# Patient Record
Sex: MALE | Race: WHITE | Employment: FULL TIME | URBAN - METROPOLITAN AREA
[De-identification: names, ages, dates, MRNs, and addresses within clinical notes are randomized per-mention and may not be internally consistent; named-entity substitution may affect disease eponyms.]

---

## 2017-03-28 ENCOUNTER — OFFICE VISIT (OUTPATIENT)
Dept: FAMILY MEDICINE CLINIC | Facility: CLINIC | Age: 57
End: 2017-03-28

## 2017-03-28 VITALS
HEIGHT: 70.5 IN | WEIGHT: 185 LBS | OXYGEN SATURATION: 97 % | BODY MASS INDEX: 26.19 KG/M2 | SYSTOLIC BLOOD PRESSURE: 100 MMHG | DIASTOLIC BLOOD PRESSURE: 60 MMHG | RESPIRATION RATE: 16 BRPM | TEMPERATURE: 98 F | HEART RATE: 68 BPM

## 2017-03-28 DIAGNOSIS — M79.89 PAIN AND SWELLING OF TOE, RIGHT: Primary | ICD-10-CM

## 2017-03-28 DIAGNOSIS — M79.674 PAIN AND SWELLING OF TOE, RIGHT: Primary | ICD-10-CM

## 2017-03-28 DIAGNOSIS — M79.671 FOOT PAIN, RIGHT: ICD-10-CM

## 2017-03-28 PROCEDURE — 99214 OFFICE O/P EST MOD 30 MIN: CPT | Performed by: FAMILY MEDICINE

## 2017-03-28 RX ORDER — IBUPROFEN 200 MG
200 TABLET ORAL AS NEEDED
COMMUNITY
End: 2017-07-27 | Stop reason: ALTCHOICE

## 2017-03-28 RX ORDER — NAPROXEN 500 MG/1
500 TABLET ORAL 2 TIMES DAILY WITH MEALS
Qty: 28 TABLET | Refills: 0 | Status: SHIPPED | OUTPATIENT
Start: 2017-03-28 | End: 2017-04-11

## 2017-03-28 NOTE — PATIENT INSTRUCTIONS
Toe Sprain  A sprain is a stretching or tearing of the ligaments that hold a joint together. There are no broken bones. Sprains generally take from 3–6 weeks to heal. A toe sprain may be treated by taping the injured toe to the next toe.  This is called b · If kennedy tape was applied and it becomes wet or dirty, change it. You may replace it with paper, plastic or cloth tape. Cloth tape and paper tapes must be kept dry. Apply gauze or cotton padding between the toes, especially near webbed area.  This will he

## 2017-03-28 NOTE — PROGRESS NOTES
[facility] Office Note  Chief Complaint:   Patient presents with: Toe Pain: jammed right big toe while playing volleyball x 1 month ago. Feels pressure around whole foot. Pain around toe nail.        HPI:   This is a 61year old m congestion, runny nose or sore throat. INTEGUMENTARY:  Denies rashes, itching, skin lesion, or excessive skin dryness.   CARDIOVASCULAR:  Denies chest pain, chest pressure, chest discomfort, palpitations, edema, dyspnea on exertion or at rest.  RESPIRATORY gallops. LUNGS: Clear to auscultation bilterally, no rales/rhonchi/wheezing. CHEST: No tenderness. ABDOMEN:  Soft, nondistended, nontender, bowel sounds normal in all 4 quadrants, no masses, no hepatosplenomegaly. BACK: No tenderness, no spasm.   EXTREM

## 2017-03-29 ENCOUNTER — HOSPITAL ENCOUNTER (OUTPATIENT)
Dept: GENERAL RADIOLOGY | Age: 57
Discharge: HOME | End: 2017-03-29
Attending: FAMILY MEDICINE
Payer: COMMERCIAL

## 2017-03-29 DIAGNOSIS — M79.674 PAIN AND SWELLING OF TOE, RIGHT: ICD-10-CM

## 2017-03-29 DIAGNOSIS — M79.671 FOOT PAIN, RIGHT: ICD-10-CM

## 2017-03-29 DIAGNOSIS — M79.89 PAIN AND SWELLING OF TOE, RIGHT: ICD-10-CM

## 2017-03-29 PROCEDURE — 73630 X-RAY EXAM OF FOOT: CPT

## 2017-03-29 PROCEDURE — 73660 X-RAY EXAM OF TOE(S): CPT

## 2017-04-25 PROBLEM — S92.421A FRACTURE OF DISTAL PHALANX OF RIGHT GREAT TOE: Status: ACTIVE | Noted: 2017-04-25

## 2017-07-27 ENCOUNTER — OFFICE VISIT (OUTPATIENT)
Dept: FAMILY MEDICINE CLINIC | Facility: CLINIC | Age: 57
End: 2017-07-27

## 2017-07-27 ENCOUNTER — APPOINTMENT (OUTPATIENT)
Dept: LAB | Age: 57
End: 2017-07-27
Attending: FAMILY MEDICINE
Payer: COMMERCIAL

## 2017-07-27 VITALS
RESPIRATION RATE: 16 BRPM | HEIGHT: 72 IN | HEART RATE: 60 BPM | TEMPERATURE: 97 F | SYSTOLIC BLOOD PRESSURE: 100 MMHG | DIASTOLIC BLOOD PRESSURE: 56 MMHG | BODY MASS INDEX: 24.65 KG/M2 | WEIGHT: 182 LBS

## 2017-07-27 DIAGNOSIS — Z13.89 SCREENING FOR GENITOURINARY CONDITION: ICD-10-CM

## 2017-07-27 DIAGNOSIS — Z00.00 PHYSICAL EXAM, ANNUAL: Primary | ICD-10-CM

## 2017-07-27 DIAGNOSIS — R94.6 ABNORMAL FINDING ON EXAMINATION OF THYROID GLAND: ICD-10-CM

## 2017-07-27 DIAGNOSIS — Z12.5 SCREENING PSA (PROSTATE SPECIFIC ANTIGEN): ICD-10-CM

## 2017-07-27 DIAGNOSIS — R53.83 FATIGUE, UNSPECIFIED TYPE: ICD-10-CM

## 2017-07-27 DIAGNOSIS — Z12.11 COLON CANCER SCREENING: ICD-10-CM

## 2017-07-27 DIAGNOSIS — Z00.00 LABORATORY EXAMINATION ORDERED AS PART OF A ROUTINE GENERAL MEDICAL EXAMINATION: ICD-10-CM

## 2017-07-27 LAB
25-HYDROXYVITAMIN D (TOTAL): 28.1 NG/ML (ref 30–100)
BILIRUB UR QL STRIP.AUTO: NEGATIVE
GLUCOSE UR STRIP.AUTO-MCNC: NEGATIVE MG/DL
HAV AB SERPL IA-ACNC: 589 PG/ML (ref 193–986)
LEUKOCYTE ESTERASE UR QL STRIP.AUTO: NEGATIVE
NITRITE UR QL STRIP.AUTO: NEGATIVE
PH UR STRIP.AUTO: 5 [PH] (ref 4.5–8)
PROT UR STRIP.AUTO-MCNC: NEGATIVE MG/DL
PSA SERPL-MCNC: 1.14 NG/ML (ref 0.01–4)
RBC UR QL AUTO: NEGATIVE
SP GR UR STRIP.AUTO: 1.03 (ref 1–1.03)
UROBILINOGEN UR STRIP.AUTO-MCNC: <2 MG/DL

## 2017-07-27 PROCEDURE — 82306 VITAMIN D 25 HYDROXY: CPT

## 2017-07-27 PROCEDURE — 81003 URINALYSIS AUTO W/O SCOPE: CPT

## 2017-07-27 PROCEDURE — 36415 COLL VENOUS BLD VENIPUNCTURE: CPT

## 2017-07-27 PROCEDURE — 99396 PREV VISIT EST AGE 40-64: CPT | Performed by: FAMILY MEDICINE

## 2017-07-27 PROCEDURE — 82607 VITAMIN B-12: CPT

## 2017-07-27 PROCEDURE — 84153 ASSAY OF PSA TOTAL: CPT

## 2017-07-27 NOTE — PROGRESS NOTES
Sultana Hoffmann is a 61year old male who presents for a complete physical exam.   HPI:   Pt has complains of feeling tired , fatigued. Will check vitamins.        Immunization History  Administered            Date(s) Administered    TDAP                  04 unusual skin lesions  EYES:denies blurred vision or double vision  HEENT: denies nasal congestion, sinus pain or ST  LUNGS: denies shortness of breath with exertion  CARDIOVASCULAR: denies chest pain on exertion  GI: denies abdominal pain,denies heartburn prescriptions requested or ordered in this encounter  Healthy diet. Stay active. Call [phone] to schedule US thyroid. Pt sees emanuel for his brain arthery dilatation gets imaging  tests every 2-3 years.    Blood tests from [facility] from 5/3/17 rev

## 2017-07-28 ENCOUNTER — TELEPHONE (OUTPATIENT)
Dept: FAMILY MEDICINE CLINIC | Facility: CLINIC | Age: 57
End: 2017-07-28

## 2017-07-28 DIAGNOSIS — E55.9 VITAMIN D DEFICIENCY: Primary | ICD-10-CM

## 2017-07-30 RX ORDER — ERGOCALCIFEROL 1.25 MG/1
50000 CAPSULE ORAL WEEKLY
Qty: 12 CAPSULE | Refills: 0 | Status: SHIPPED | OUTPATIENT
Start: 2017-07-30 | End: 2017-08-29

## 2017-08-12 ENCOUNTER — HOSPITAL ENCOUNTER (OUTPATIENT)
Dept: ULTRASOUND IMAGING | Age: 57
Discharge: HOME | End: 2017-08-12
Attending: FAMILY MEDICINE
Payer: COMMERCIAL

## 2017-08-12 DIAGNOSIS — R94.6 ABNORMAL FINDING ON EXAMINATION OF THYROID GLAND: ICD-10-CM

## 2017-08-12 PROCEDURE — 76536 US EXAM OF HEAD AND NECK: CPT | Performed by: FAMILY MEDICINE

## 2017-08-16 DIAGNOSIS — E04.1 THYROID NODULE: Primary | ICD-10-CM

## 2017-08-22 ENCOUNTER — APPOINTMENT (OUTPATIENT)
Dept: LAB | Facility: HOSPITAL | Age: 57
End: 2017-08-22
Attending: FAMILY MEDICINE
Payer: COMMERCIAL

## 2017-08-22 DIAGNOSIS — Z12.11 COLON CANCER SCREENING: ICD-10-CM

## 2017-08-22 PROCEDURE — 82272 OCCULT BLD FECES 1-3 TESTS: CPT

## 2017-10-26 ENCOUNTER — TELEPHONE (OUTPATIENT)
Dept: FAMILY MEDICINE CLINIC | Facility: CLINIC | Age: 57
End: 2017-10-26

## 2017-10-26 NOTE — TELEPHONE ENCOUNTER
There is a little a language barrier but I believe this pt is asking to have a new thyroid test and he also says he should have a full blood work up.

## 2017-10-26 NOTE — TELEPHONE ENCOUNTER
S/w pt. He is asking if he needs a blood test. I stated he has a vitamin d test in system. No other labs are due.  He said he will just talk with  at [facility] in am.

## 2017-10-27 ENCOUNTER — OFFICE VISIT (OUTPATIENT)
Dept: FAMILY MEDICINE CLINIC | Facility: CLINIC | Age: 57
End: 2017-10-27

## 2017-10-27 VITALS
WEIGHT: 179 LBS | BODY MASS INDEX: 24.24 KG/M2 | SYSTOLIC BLOOD PRESSURE: 110 MMHG | RESPIRATION RATE: 16 BRPM | HEART RATE: 59 BPM | DIASTOLIC BLOOD PRESSURE: 60 MMHG | HEIGHT: 72 IN | TEMPERATURE: 97 F

## 2017-10-27 DIAGNOSIS — E78.00 PURE HYPERCHOLESTEROLEMIA: ICD-10-CM

## 2017-10-27 DIAGNOSIS — E04.1 THYROID NODULE: Primary | ICD-10-CM

## 2017-10-27 DIAGNOSIS — E55.9 VITAMIN D DEFICIENCY: ICD-10-CM

## 2017-10-27 PROCEDURE — 99214 OFFICE O/P EST MOD 30 MIN: CPT | Performed by: FAMILY MEDICINE

## 2017-10-27 NOTE — PROGRESS NOTES
Sultana Hoffmann is a 61year old male. cc thyroid nodule, vitamin D deficiency, colon cancer screening, hyperlipidemia   HPI:   Patient is coming to discuss results.   He had ultrasound of his thyroid done in August.  It showed about 1 cm in diameter thyroid headaches  Psych normal mood.     EXAM:   /60 (BP Location: Left arm, Patient Position: Sitting, Cuff Size: adult)   Pulse 59   Temp (!) 97.1 °F (36.2 °C) (Oral)   Resp 16   Ht 72\"   Wt 179 lb   BMI 24.28 kg/m²   GENERAL: well developed, well nourish =====  CONCLUSION:  Solitary 11 mm left superior pole thyroid nodule.            Dictated by: Mae Estrada MD on 8/12/2017 at 11:49       Approved by: Mae Estrada MD       Imaging & Consults:  US THYROID ([id number])    The patient indicates unders

## 2017-10-27 NOTE — PATIENT INSTRUCTIONS
Call [phone] to schedule US thyroid in December. Set up my chart will send you information regarding her test results this way. Healthy diet  Low-fat senior diet. Return to recheck vitamin D and thyroid antibodies in December 2017.

## 2017-11-14 ENCOUNTER — TELEPHONE (OUTPATIENT)
Dept: FAMILY MEDICINE CLINIC | Facility: CLINIC | Age: 57
End: 2017-11-14

## 2017-11-14 NOTE — TELEPHONE ENCOUNTER
Information and recommendations given via [facility] (Mindy [phone]), understanding verbalized,  patient unable to be seen at that time due to work schedule, can only be seen before noon.   Please advise, thank you

## 2017-11-14 NOTE — TELEPHONE ENCOUNTER
Outgoing call to patient, we are unable to communicate due to language barrier. [facility]  contacted via  serviceRadha Celis, [phone]), connected, se is unable to communicate with patient, states [facility]  needed,.   [facility]

## 2017-11-14 NOTE — TELEPHONE ENCOUNTER
Patient came in and wanted an appt. Right away. He is hard to understand what is wrong. He first said he had back pain and then he said hemmoroids. Please call asap as he wants to get in quicker than her next appointment.  He said he wanted to speak with

## 2017-11-14 NOTE — TELEPHONE ENCOUNTER
I can see him ftomorrow 4:45 PM tomorrow for evaluation. If we would have cancellation will call him to move up his appointment. Otherwise if it would  not be working for him I could see him Thursday or if worsening symptoms go to urgent care.

## 2017-11-16 ENCOUNTER — OFFICE VISIT (OUTPATIENT)
Dept: FAMILY MEDICINE CLINIC | Facility: CLINIC | Age: 57
End: 2017-11-16

## 2017-11-16 VITALS
HEART RATE: 63 BPM | HEIGHT: 72 IN | SYSTOLIC BLOOD PRESSURE: 102 MMHG | DIASTOLIC BLOOD PRESSURE: 62 MMHG | WEIGHT: 184 LBS | BODY MASS INDEX: 24.92 KG/M2 | TEMPERATURE: 99 F | RESPIRATION RATE: 14 BRPM

## 2017-11-16 DIAGNOSIS — K64.4 EXTERNAL HEMORRHOID, BLEEDING: Primary | ICD-10-CM

## 2017-11-16 PROCEDURE — 99213 OFFICE O/P EST LOW 20 MIN: CPT | Performed by: FAMILY MEDICINE

## 2017-11-16 NOTE — PROGRESS NOTES
Jimy Mendoza is a 61year old male. cc nodule in the anal area  HPI:   Patients come to the office for evaluation of the nodule which he noticed Saturday evening in the anal area. It was protruding not painful.   On Tuesday this week patient started to no not performed  EXTREMITIES: no cyanosis, clubbing or edema  Psychiatric - alert  and oriented x3, normal mood     ASSESSMENT AND PLAN:   External hemorrhoid, bleeding  (primary encounter diagnosis)    No orders of the defined types were placed in this enco

## 2017-12-14 ENCOUNTER — OFFICE VISIT (OUTPATIENT)
Dept: SURGERY | Facility: CLINIC | Age: 57
End: 2017-12-14

## 2017-12-14 VITALS
TEMPERATURE: 98 F | BODY MASS INDEX: 24.92 KG/M2 | DIASTOLIC BLOOD PRESSURE: 81 MMHG | SYSTOLIC BLOOD PRESSURE: 125 MMHG | HEIGHT: 72 IN | HEART RATE: 64 BPM | WEIGHT: 184 LBS

## 2017-12-14 DIAGNOSIS — K64.2 GRADE III INTERNAL HEMORRHOIDS: ICD-10-CM

## 2017-12-14 DIAGNOSIS — K92.2 INTESTINAL BLEEDING: Primary | ICD-10-CM

## 2017-12-14 PROCEDURE — 46600 DIAGNOSTIC ANOSCOPY SPX: CPT | Performed by: COLON & RECTAL SURGERY

## 2017-12-14 PROCEDURE — 99243 OFF/OP CNSLTJ NEW/EST LOW 30: CPT | Performed by: COLON & RECTAL SURGERY

## 2017-12-15 NOTE — H&P
New Patient Visit Note       Active Problems      1. Intestinal bleeding    2.  Grade III internal hemorrhoids        Chief Complaint   Internal hemorrhoids    History of Present Illness   The patient presents for evaluation of rectal bleeding and rectal pa Date of first symptoms? 2015   How often does it happen? 6 months   If you had pain, what was the pain at its worst? 0   Are you having any pain today? 0     Do you have any blood on the stool? no   Do you have any blood on the toilet paper?  no   Do yo date: ANESTH,KNEE AREA SURGERY      Comment: torn ACL left  3/09: APPENDECTOMY  No date: OTHER SURGICAL HISTORY      Comment: left knee surgery  No date: OTHER SURGICAL HISTORY      Comment: lipomas    The family history and social history have been review diarrhea, nausea and vomiting. Genitourinary: Negative for difficulty urinating, dysuria, frequency and urgency. Musculoskeletal: Negative for arthralgias and myalgias. Skin: Negative for color change and rash.    Neurological: Negative for tremors, s external hemorrhoid that \"pops\" and bleeds. He feels something in the anal area that comes and goes. He states at this visit today he feels well and does not have these symptoms. However, about once a month, this hemorrhoid will protrude and bleed.  He ha confirmed with the patient. No orders of the defined types were placed in this encounter. Imaging & Referrals   None    Follow Up  Return in about 2 weeks (around 12/28/2017) for rubber band treatments.     Karin Sevilla MD

## 2017-12-17 RX ORDER — POLYETHYLENE GLYCOL 3350, SODIUM CHLORIDE, SODIUM BICARBONATE, POTASSIUM CHLORIDE 420; 11.2; 5.72; 1.48 G/4L; G/4L; G/4L; G/4L
POWDER, FOR SOLUTION ORAL
Qty: 1 BOTTLE | Refills: 0 | Status: SHIPPED | OUTPATIENT
Start: 2017-12-17 | End: 2018-08-11 | Stop reason: CLARIF

## 2017-12-18 ENCOUNTER — HOSPITAL ENCOUNTER (OUTPATIENT)
Dept: ULTRASOUND IMAGING | Age: 57
Discharge: HOME | End: 2017-12-18
Attending: FAMILY MEDICINE
Payer: COMMERCIAL

## 2017-12-18 DIAGNOSIS — E04.1 THYROID NODULE: ICD-10-CM

## 2017-12-18 PROBLEM — K64.2 GRADE III INTERNAL HEMORRHOIDS: Status: ACTIVE | Noted: 2017-12-18

## 2017-12-18 PROBLEM — K92.2 INTESTINAL BLEEDING: Status: ACTIVE | Noted: 2017-12-18

## 2017-12-18 PROCEDURE — 76536 US EXAM OF HEAD AND NECK: CPT | Performed by: FAMILY MEDICINE

## 2017-12-18 NOTE — PATIENT INSTRUCTIONS
The patient presents for evaluation of rectal bleeding and rectal pain. The patient reports rectal bleeding and pain for the last month. He states he thinks he has an external hemorrhoid that \"pops\" and bleeds.  He feels something in the anal area that description of the procedure and its possible outcomes was fully discussed. The patient seemed to understand the conversation and its details. Consent for surgery was confirmed with the patient.

## 2017-12-18 NOTE — PROCEDURES
Procedure:  Anoscopy    Surgeon: Ruy Ferreira    Anesthesia: None    Findings: See the progress note attached for all findings    Operative Summary: The patient was placed in a prone position on the proctoscopy table, the hips were flexed in the jackknife p

## 2017-12-20 ENCOUNTER — TELEPHONE (OUTPATIENT)
Dept: FAMILY MEDICINE CLINIC | Facility: CLINIC | Age: 57
End: 2017-12-20

## 2017-12-20 DIAGNOSIS — E04.1 LEFT THYROID NODULE: Primary | ICD-10-CM

## 2018-01-15 ENCOUNTER — OFFICE VISIT (OUTPATIENT)
Dept: SURGERY | Facility: CLINIC | Age: 58
End: 2018-01-15

## 2018-01-15 VITALS
HEART RATE: 75 BPM | SYSTOLIC BLOOD PRESSURE: 107 MMHG | BODY MASS INDEX: 24.92 KG/M2 | TEMPERATURE: 98 F | DIASTOLIC BLOOD PRESSURE: 73 MMHG | WEIGHT: 184 LBS | HEIGHT: 72 IN

## 2018-01-15 DIAGNOSIS — K92.2 INTESTINAL BLEEDING: ICD-10-CM

## 2018-01-15 DIAGNOSIS — K64.2 GRADE III INTERNAL HEMORRHOIDS: Primary | ICD-10-CM

## 2018-01-15 PROCEDURE — 46221 LIGATION OF HEMORRHOID(S): CPT | Performed by: COLON & RECTAL SURGERY

## 2018-01-15 NOTE — PROGRESS NOTES
Follow Up Visit Note       Active Problems      1. Grade III internal hemorrhoids    2.  Intestinal bleeding          Chief Complaint   Patient presents with:  Anal Problem (GI): 1st Banding        History of Present Illness        Allergies  Yanick has No Kn Systems has been reviewed by me during today. Review of Systems   Constitutional: Negative for chills, diaphoresis, fatigue, fever and unexpected weight change. HENT: Negative for hearing loss, nosebleeds, sore throat and trouble swallowing.     Respirat None    Follow Up  Return in about 2 weeks (around 1/29/2018).     Bella Ortiz MD

## 2018-01-15 NOTE — PROCEDURES
Pre op diagnosis: Internal Hemorrhoids    Post op diagnosis: Same    Procedure: Anoscopy with O-ring Rubber Band Ligation of Internal Hemorrhoids    Surgeon: Braxton Payne MD    History of present illness:    This patient has internal hemorrhoids that are s

## 2018-01-15 NOTE — PATIENT INSTRUCTIONS
At today's office visit we treated a right anterolateral internal hemorrhoid. At today's visit, the patient underwent an uncomplicated application of a rubber band and injection of a 5% phenol solution into the base of the rubberbanded hemorrhoid.     Delynn Medici

## 2018-02-26 ENCOUNTER — OFFICE VISIT (OUTPATIENT)
Dept: SURGERY | Facility: CLINIC | Age: 58
End: 2018-02-26

## 2018-02-26 VITALS
WEIGHT: 184 LBS | DIASTOLIC BLOOD PRESSURE: 82 MMHG | TEMPERATURE: 98 F | BODY MASS INDEX: 24.92 KG/M2 | SYSTOLIC BLOOD PRESSURE: 130 MMHG | HEART RATE: 65 BPM | HEIGHT: 72 IN

## 2018-02-26 DIAGNOSIS — K64.2 GRADE III INTERNAL HEMORRHOIDS: Primary | ICD-10-CM

## 2018-02-26 PROCEDURE — 46221 LIGATION OF HEMORRHOID(S): CPT | Performed by: COLON & RECTAL SURGERY

## 2018-02-26 NOTE — PROGRESS NOTES
Follow Up Visit Note       Active Problems      1. Grade III internal hemorrhoids          Chief Complaint   Patient presents with:  Hemorrhoids: 2nd banding        History of Present Illness        Allergies  Yanick has No Known Allergies.     Past Medical / during today. Review of Systems   Constitutional: Negative for chills, diaphoresis, fatigue, fever and unexpected weight change. HENT: Negative for hearing loss, nosebleeds, sore throat and trouble swallowing.     Respiratory: Negative for apnea, cough,

## 2018-03-12 ENCOUNTER — OFFICE VISIT (OUTPATIENT)
Dept: SURGERY | Facility: CLINIC | Age: 58
End: 2018-03-12

## 2018-03-12 VITALS
BODY MASS INDEX: 24.92 KG/M2 | HEART RATE: 64 BPM | SYSTOLIC BLOOD PRESSURE: 127 MMHG | HEIGHT: 72 IN | TEMPERATURE: 98 F | WEIGHT: 184 LBS | DIASTOLIC BLOOD PRESSURE: 77 MMHG

## 2018-03-12 DIAGNOSIS — K64.2 GRADE III INTERNAL HEMORRHOIDS: Primary | ICD-10-CM

## 2018-03-12 PROCEDURE — 46221 LIGATION OF HEMORRHOID(S): CPT | Performed by: COLON & RECTAL SURGERY

## 2018-03-12 NOTE — PROGRESS NOTES
Follow Up Visit Note       Active Problems      1. Grade III internal hemorrhoids          Chief Complaint   Patient presents with:  Hemorrhoids: 3rd banding        History of Present Illness        Allergies  Yanick has No Known Allergies.     Past Medical / during today. Review of Systems   Constitutional: Negative for chills, diaphoresis, fatigue, fever and unexpected weight change. HENT: Negative for hearing loss, nosebleeds, sore throat and trouble swallowing.     Respiratory: Negative for apnea, cough, Dwain Phoenix MD

## 2018-03-15 NOTE — PROCEDURES
Pre op diagnosis: Internal Hemorrhoids    Post op diagnosis: Same    Procedure: Anoscopy with O-ring Rubber Band Ligation of Internal Hemorrhoids    Surgeon: Tiff Sanchez MD    History of present illness:    This patient has internal hemorrhoids that are s

## 2018-03-15 NOTE — PATIENT INSTRUCTIONS
This patient underwent treatment of a left lateral internal hemorrhoid at today's visit.     At today's visit, the patient underwent an uncomplicated application of a rubber band and injection of a 5% phenol solution into the base of the rubberbanded hemorr

## 2018-03-17 NOTE — PATIENT INSTRUCTIONS
We treated a right posterior lateral internal hemorrhoid at today's visit. At today's visit, the patient underwent an uncomplicated application of a rubber band and injection of a 5% phenol solution into the base of the rubberbanded hemorrhoid.     The p

## 2018-03-17 NOTE — PROCEDURES
Pre op diagnosis: Internal Hemorrhoids    Post op diagnosis: Same    Procedure: Anoscopy with O-ring Rubber Band Ligation of Internal Hemorrhoids    Surgeon: Ruy Ferreira MD    History of present illness:    This patient has internal hemorrhoids that are s

## 2018-03-26 ENCOUNTER — OFFICE VISIT (OUTPATIENT)
Dept: SURGERY | Facility: CLINIC | Age: 58
End: 2018-03-26

## 2018-03-26 VITALS
TEMPERATURE: 98 F | HEIGHT: 72 IN | WEIGHT: 190 LBS | BODY MASS INDEX: 25.73 KG/M2 | HEART RATE: 62 BPM | DIASTOLIC BLOOD PRESSURE: 79 MMHG | SYSTOLIC BLOOD PRESSURE: 128 MMHG

## 2018-03-26 DIAGNOSIS — K92.2 INTESTINAL BLEEDING: ICD-10-CM

## 2018-03-26 DIAGNOSIS — K64.2 GRADE III INTERNAL HEMORRHOIDS: Primary | ICD-10-CM

## 2018-03-26 PROCEDURE — 46221 LIGATION OF HEMORRHOID(S): CPT | Performed by: COLON & RECTAL SURGERY

## 2018-03-26 NOTE — PROGRESS NOTES
Follow Up Visit Note       Active Problems      1. Grade III internal hemorrhoids    2.  Intestinal bleeding          Chief Complaint   Patient presents with:  Anal / Rectal Problem: 4th banding- No concerns        History of Present Illness        Allergie Systems  The Review of Systems has been reviewed by me during today. Review of Systems   Constitutional: Negative for chills, diaphoresis, fatigue, fever and unexpected weight change.    HENT: Negative for hearing loss, nosebleeds, sore throat and trouble see me on an as-needed basis. This patient should return urgently for any problems or complications related to my surgical intervention. No orders of the defined types were placed in this encounter.       Imaging & Referrals   None    Follow Up  Re

## 2018-03-26 NOTE — PROCEDURES
Pre op diagnosis: Internal Hemorrhoids    Post op diagnosis: Same    Procedure: Anoscopy with O-ring Rubber Band Ligation of Internal Hemorrhoids    Surgeon: Freeman Pang MD    History of present illness:    This patient has internal hemorrhoids that are s

## 2018-03-26 NOTE — PATIENT INSTRUCTIONS
At today's office visit we treated an anterior midline internal hemorrhoid. At today's visit, the patient underwent an uncomplicated application of a rubber band and injection of a 5% phenol solution into the base of the rubberbanded hemorrhoid.     The

## 2018-04-26 ENCOUNTER — OFFICE VISIT (OUTPATIENT)
Dept: FAMILY MEDICINE CLINIC | Facility: CLINIC | Age: 58
End: 2018-04-26

## 2018-04-26 VITALS
HEART RATE: 64 BPM | DIASTOLIC BLOOD PRESSURE: 70 MMHG | RESPIRATION RATE: 16 BRPM | WEIGHT: 192 LBS | BODY MASS INDEX: 26.01 KG/M2 | SYSTOLIC BLOOD PRESSURE: 110 MMHG | TEMPERATURE: 98 F | HEIGHT: 72 IN

## 2018-04-26 DIAGNOSIS — H60.391 ACUTE INFECTIVE OTITIS EXTERNA, RIGHT: Primary | ICD-10-CM

## 2018-04-26 PROCEDURE — 99213 OFFICE O/P EST LOW 20 MIN: CPT | Performed by: FAMILY MEDICINE

## 2018-04-26 RX ORDER — NEOMYCIN SULFATE, POLYMYXIN B SULFATE AND HYDROCORTISONE 10; 3.5; 1 MG/ML; MG/ML; [USP'U]/ML
4 SUSPENSION/ DROPS AURICULAR (OTIC) 3 TIMES DAILY
Qty: 10 ML | Refills: 0 | Status: SHIPPED | OUTPATIENT
Start: 2018-04-26 | End: 2018-04-28

## 2018-04-26 NOTE — PROGRESS NOTES
Yani Morel is a 61year old male. cc right ear pain  HPI:   She is coming to the office for evaluation of the right ear pain which he has on and off for a year. Last night the pain got quite severe. There is no discharge. There is no tenderness.   No externa, right  (primary encounter diagnosis)    No orders of the defined types were placed in this encounter.       Meds & Refills for this Visit:  Signed Prescriptions Disp Refills    Neomycin-Polymyxin-HC 3.5-83524-6 Otic Suspension 10 mL 0      Sig: Shea

## 2018-04-30 RX ORDER — NEOMYCIN SULFATE, POLYMYXIN B SULFATE AND HYDROCORTISONE 10; 3.5; 1 MG/ML; MG/ML; [USP'U]/ML
SUSPENSION/ DROPS AURICULAR (OTIC)
Qty: 10 ML | Refills: 0 | Status: SHIPPED | OUTPATIENT
Start: 2018-04-30 | End: 2018-08-11 | Stop reason: CLARIF

## 2018-04-30 NOTE — TELEPHONE ENCOUNTER
LINDSEY/POLY/HC 1% OTIC SUSPGREEN(EAR)    Pt states he lost the bottle at work and it can't be found. He is requesting refill if appropriate.      Please advise

## 2018-06-11 ENCOUNTER — TELEPHONE (OUTPATIENT)
Dept: FAMILY MEDICINE CLINIC | Facility: CLINIC | Age: 58
End: 2018-06-11

## 2018-06-11 NOTE — TELEPHONE ENCOUNTER
PT wants a physical for him and his wife after July 27 (his last) and before July 30th for work purposes. Please advise.

## 2018-08-11 ENCOUNTER — OFFICE VISIT (OUTPATIENT)
Dept: FAMILY MEDICINE CLINIC | Facility: CLINIC | Age: 58
End: 2018-08-11

## 2018-08-11 VITALS
TEMPERATURE: 98 F | BODY MASS INDEX: 26.05 KG/M2 | RESPIRATION RATE: 12 BRPM | WEIGHT: 182 LBS | HEIGHT: 70 IN | HEART RATE: 62 BPM | SYSTOLIC BLOOD PRESSURE: 108 MMHG | DIASTOLIC BLOOD PRESSURE: 60 MMHG

## 2018-08-11 DIAGNOSIS — Z00.00 ROUTINE GENERAL MEDICAL EXAMINATION AT A HEALTH CARE FACILITY: Primary | ICD-10-CM

## 2018-08-11 DIAGNOSIS — R93.0 ABNORMAL MRI OF HEAD: ICD-10-CM

## 2018-08-11 DIAGNOSIS — Z00.00 LABORATORY EXAMINATION ORDERED AS PART OF A ROUTINE GENERAL MEDICAL EXAMINATION: ICD-10-CM

## 2018-08-11 DIAGNOSIS — E55.9 VITAMIN D DEFICIENCY: ICD-10-CM

## 2018-08-11 DIAGNOSIS — Z12.11 SCREEN FOR COLON CANCER: ICD-10-CM

## 2018-08-11 DIAGNOSIS — E78.00 PURE HYPERCHOLESTEROLEMIA: ICD-10-CM

## 2018-08-11 PROCEDURE — 99396 PREV VISIT EST AGE 40-64: CPT | Performed by: FAMILY MEDICINE

## 2018-08-11 NOTE — PROGRESS NOTES
Brenda Thompson is a 61year old male who presents for a complete physical exam.   HPI:   Pt has  low vitamin D in the past he would like to have repeated testing done for her vitamin D.   Cholesterol was elevated he had a blood work done at his work on May found for: GLUCOSE      Current Outpatient Prescriptions:  Cholecalciferol (VITAMIN D) 1000 units Oral Tab Take 1 capsule by mouth daily.  Disp:  Rfl:       Past Medical History:   Diagnosis Date   • Other and unspecified hyperlipidemia    • Vitamin D defic and throat are clear  EYES:PERRLA, EOMI, conjunctiva are clear  NECK: supple,no adenopathy,palpable thyroid,   CHEST: no chest tenderness  BREAST: no dominant or suspicious mass  LUNGS: clear to auscultation  CARDIO: RRR without murmur  GI: good BS's,no ma check fasting Lipids, CMP, CBC and PSA. Pt referred for screening colonoscopy - he does not want to have colonicdoen not want colonoscopy FOBT card given. . The patient indicates understanding of these issues and agrees to the plan.   The patient is asked t

## 2018-11-12 ENCOUNTER — TELEPHONE (OUTPATIENT)
Dept: FAMILY MEDICINE CLINIC | Facility: CLINIC | Age: 58
End: 2018-11-12

## 2018-11-12 DIAGNOSIS — H92.09 OTALGIA, UNSPECIFIED LATERALITY: ICD-10-CM

## 2018-11-12 DIAGNOSIS — H92.01 RIGHT EAR PAIN: Primary | ICD-10-CM

## 2018-11-12 NOTE — TELEPHONE ENCOUNTER
Referral to ENT - Please choose internal    Reason for the order/referral:  Referral request to ENT pain continues after ear drops no relief   PCP: Dr Lisa Kwon   Refer to Provider (first and last name):  Unknown Please advise pt   Specialty: ENT   Ananda

## 2019-02-01 NOTE — TELEPHONE ENCOUNTER
Called to pt, number given reaches son Erik Graf. Erik Graf stated that pt does not speak English and asked him to call back to discuss. Advised son that the timeline that pt gave regarding wanting a physical for both him and his wife between 7/28/18-7/30/18. Detail Level: Zone

## 2019-04-06 ENCOUNTER — OFFICE VISIT (OUTPATIENT)
Dept: FAMILY MEDICINE CLINIC | Facility: CLINIC | Age: 59
End: 2019-04-06

## 2019-04-06 VITALS
RESPIRATION RATE: 14 BRPM | HEIGHT: 69 IN | HEART RATE: 64 BPM | BODY MASS INDEX: 27.85 KG/M2 | WEIGHT: 188 LBS | DIASTOLIC BLOOD PRESSURE: 70 MMHG | SYSTOLIC BLOOD PRESSURE: 120 MMHG

## 2019-04-06 DIAGNOSIS — N50.819 TESTES PAIN: Primary | ICD-10-CM

## 2019-04-06 DIAGNOSIS — R30.0 DYSURIA: ICD-10-CM

## 2019-04-06 DIAGNOSIS — R90.89 ABNORMAL FINDING ON MRI OF BRAIN: ICD-10-CM

## 2019-04-06 PROCEDURE — 87086 URINE CULTURE/COLONY COUNT: CPT | Performed by: FAMILY MEDICINE

## 2019-04-06 PROCEDURE — 81003 URINALYSIS AUTO W/O SCOPE: CPT | Performed by: FAMILY MEDICINE

## 2019-04-06 PROCEDURE — 99214 OFFICE O/P EST MOD 30 MIN: CPT | Performed by: FAMILY MEDICINE

## 2019-04-06 NOTE — PROGRESS NOTES
Patient refused times available for today for ultrasound. He insisted on Monday between 8-12. I did book appointment for Monday at 11:15 in [facility], I did leave this information on his voice mail. Tena Cooks is a 60year old male. cc pain after recommended in the past to see neurologist which he did not do. Pt was on cholesterol-lowering medication in the past but he stopped medication when his cholesterol numbers got better.   He will try to go back to aspirin enteric-coated until seeing special 48-year-old male, hyperacusis, right ear. TECHNIQUE: Multiplanar multisequence MRI of the brain was performed utilizing the [place] [address] routine adult brain IAC protocol. Scanning is performed on a 3.0 Becky magnet.  8.5 cc of  Gadavist gadolinium IKM,  TESTICULAR W/ DOPPLER (CPT=93975/54602), 6/21/2013, 10:45.      INDICATIONS:  R30.0 Dysuria N50.819 Testicular pain, unspecified     TECHNIQUE:  Real time grey scale ultrasound was performed of the scrotal contents including the testicles, ep There is some burning after ejaculation. Will refer patient to see urologist for evaluation.   Will hold on an antibiotic treatment until seen by specialist.  Arturo Jacobs for this Visit:  Requested Prescriptions      No prescriptions requested or order

## 2019-04-06 NOTE — PATIENT INSTRUCTIONS
Do ultrasound of the scrotum further recommendation pending results. We will send urine for additional testing and call you back with those results. See neurologist Dr. Jacqui Chahal for reevaluation.    See Dr. Abdirahman Neff for evaluation-  this is very important t

## 2019-04-08 ENCOUNTER — HOSPITAL ENCOUNTER (OUTPATIENT)
Dept: ULTRASOUND IMAGING | Age: 59
Discharge: HOME | End: 2019-04-08
Attending: FAMILY MEDICINE
Payer: COMMERCIAL

## 2019-04-08 DIAGNOSIS — R30.0 DYSURIA: ICD-10-CM

## 2019-04-08 DIAGNOSIS — N50.819 TESTES PAIN: ICD-10-CM

## 2019-04-08 PROCEDURE — 93975 VASCULAR STUDY: CPT | Performed by: FAMILY MEDICINE

## 2019-04-08 PROCEDURE — 76870 US EXAM SCROTUM: CPT | Performed by: FAMILY MEDICINE

## 2019-04-10 ENCOUNTER — TELEPHONE (OUTPATIENT)
Dept: FAMILY MEDICINE CLINIC | Facility: CLINIC | Age: 59
End: 2019-04-10

## 2019-04-10 NOTE — TELEPHONE ENCOUNTER
Pt's son called. He would like for dr Wynell Romberg to call pt back at [phone]. Pt only speaks polish. He has a question for Dr Wynell Romberg regarding his US test that he completed today.

## 2019-04-11 ENCOUNTER — TELEPHONE (OUTPATIENT)
Dept: FAMILY MEDICINE CLINIC | Facility: CLINIC | Age: 59
End: 2019-04-11

## 2019-04-11 NOTE — TELEPHONE ENCOUNTER
Good morning,     Please add the name of the specialist pt is being referred to see for this ref. [facility] will not process this ref w/o this info.      Thank you,   M[facility] Martin Dept         UROLOGY - INTERNAL

## 2019-05-15 ENCOUNTER — TELEPHONE (OUTPATIENT)
Dept: NEUROLOGY | Facility: CLINIC | Age: 59
End: 2019-05-15

## 2019-05-15 ENCOUNTER — TELEPHONE (OUTPATIENT)
Dept: SURGERY | Facility: CLINIC | Age: 59
End: 2019-05-15

## 2019-05-15 NOTE — TELEPHONE ENCOUNTER
Per discussion with PSRs patient was offered 1240 appointment today and declined. RN called patient to discuss appointment options and he states he can only come in in the morning. Per review of providers schedule, no am appointments available.     Qi

## 2019-05-15 NOTE — TELEPHONE ENCOUNTER
[facility] with Dr. Flako Castillo today and having to schedule an appointment with Dr. Joie Finch.

## 2019-05-15 NOTE — TELEPHONE ENCOUNTER
Attempted to reach patient. He needs to be worked up with neurology first.  If they find a vessel abnormality that should be evaluated by IR then he should come to see us in clinic.       I have spoken with Metro Schaumann and Ramone Contreras in both clinics and have instruc

## 2019-05-17 ENCOUNTER — OFFICE VISIT (OUTPATIENT)
Dept: NEUROLOGY | Facility: CLINIC | Age: 59
End: 2019-05-17

## 2019-05-17 VITALS
HEART RATE: 70 BPM | DIASTOLIC BLOOD PRESSURE: 72 MMHG | WEIGHT: 188 LBS | SYSTOLIC BLOOD PRESSURE: 128 MMHG | RESPIRATION RATE: 16 BRPM | BODY MASS INDEX: 28 KG/M2

## 2019-05-17 DIAGNOSIS — I65.1 VERTEBROBASILAR DOLICHOECTASIA: ICD-10-CM

## 2019-05-17 DIAGNOSIS — Z86.73 OLD PONTINE INFARCT WITHOUT LATE EFFECT: ICD-10-CM

## 2019-05-17 PROCEDURE — 99244 OFF/OP CNSLTJ NEW/EST MOD 40: CPT | Performed by: OTHER

## 2019-05-17 RX ORDER — ATORVASTATIN CALCIUM 10 MG/1
10 TABLET, FILM COATED ORAL NIGHTLY
Qty: 30 TABLET | Refills: 2 | Status: SHIPPED | OUTPATIENT
Start: 2019-05-17 | End: 2019-08-10

## 2019-05-17 NOTE — PROGRESS NOTES
HPI:    Patient ID: Lawyer Acuna is a 60year old male.   PCP: Dr Lindsey Morris    HPI       Patient is a 62-year-old right-handed male with past medical history of vertebrobasilar dolicho ectasia, hyperlipidemia who presented for evaluation of lacunar infa Yes      Comment: wine on occasion    Drug use: No         Review of Systems   Constitutional: Negative. HENT: Negative. Eyes: Negative. Respiratory: Negative. Cardiovascular: Negative. Gastrointestinal: Negative. Endocrine: Negative. 5/5 in all muscle groups  Reflexes: symmetric and present  Coordination: Intact finger to nose and heel to shin test. Normal rapid alternating movements.   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

## 2019-05-17 NOTE — PROGRESS NOTES
The patient states his vision is decreasing. The patient denies any numbness, tingling or memory changes.

## 2019-05-24 ENCOUNTER — TELEPHONE (OUTPATIENT)
Dept: NEUROLOGY | Facility: CLINIC | Age: 59
End: 2019-05-24

## 2019-05-24 NOTE — TELEPHONE ENCOUNTER
Left message for pt (OK per HIPPA) informing him that CTA and CT angiography are the same study. Encouraged pt to call back with any further concerns.

## 2019-05-24 NOTE — TELEPHONE ENCOUNTER
CTA Brain has been scheduled however after visit summary indicates that a CT Angeography needs to be scheduled. Central Scheduling said there is not an order for the CT Angeography.  Son wants to be sure pt has the required tests completed so he's asking if

## 2019-05-30 ENCOUNTER — HOSPITAL ENCOUNTER (OUTPATIENT)
Dept: CT IMAGING | Facility: HOSPITAL | Age: 59
Discharge: HOME | End: 2019-05-30
Attending: Other
Payer: COMMERCIAL

## 2019-05-30 DIAGNOSIS — I65.1 VERTEBROBASILAR DOLICHOECTASIA: ICD-10-CM

## 2019-05-30 PROCEDURE — 70496 CT ANGIOGRAPHY HEAD: CPT | Performed by: OTHER

## 2019-05-30 PROCEDURE — 82565 ASSAY OF CREATININE: CPT

## 2019-06-03 ENCOUNTER — TELEPHONE (OUTPATIENT)
Dept: NEUROLOGY | Facility: CLINIC | Age: 59
End: 2019-06-03

## 2019-06-03 NOTE — TELEPHONE ENCOUNTER
Called patient to inform of the below results. Patient requested [facility] . Called  for  service and spoke with Real Casper (#[id number]).  Explained to patient, via , Dr. Seth Brand notes below, encouraged patient to keep f/u a

## 2019-06-29 ENCOUNTER — OFFICE VISIT (OUTPATIENT)
Dept: FAMILY MEDICINE CLINIC | Facility: CLINIC | Age: 59
End: 2019-06-29

## 2019-06-29 VITALS
BODY MASS INDEX: 26.96 KG/M2 | HEIGHT: 69 IN | SYSTOLIC BLOOD PRESSURE: 110 MMHG | TEMPERATURE: 97 F | WEIGHT: 182 LBS | HEART RATE: 56 BPM | RESPIRATION RATE: 14 BRPM | DIASTOLIC BLOOD PRESSURE: 70 MMHG

## 2019-06-29 DIAGNOSIS — Z12.5 SCREENING FOR PROSTATE CANCER: ICD-10-CM

## 2019-06-29 DIAGNOSIS — N52.9 ERECTILE DYSFUNCTION, UNSPECIFIED ERECTILE DYSFUNCTION TYPE: ICD-10-CM

## 2019-06-29 DIAGNOSIS — Z12.11 SCREEN FOR COLON CANCER: ICD-10-CM

## 2019-06-29 DIAGNOSIS — Z00.00 PHYSICAL EXAM, ANNUAL: Primary | ICD-10-CM

## 2019-06-29 DIAGNOSIS — E78.00 PURE HYPERCHOLESTEROLEMIA: ICD-10-CM

## 2019-06-29 PROCEDURE — 99396 PREV VISIT EST AGE 40-64: CPT | Performed by: FAMILY MEDICINE

## 2019-06-29 NOTE — PROGRESS NOTES
Rosario Camara is a 60year old male who presents for a complete physical exam.   HPI:     Patient is doing well. He had elevated cholesterol he is seen neurologist and they put him on cholesterol lowering medication LDL May 11 was 127.   We will continue Laterality Date   • ANESTH,KNEE AREA SURGERY      torn ACL left   • APPENDECTOMY  3/09   • CYST/MASS EXCISION Bilateral 10/10/2014    Performed by Roge Saab MD at [address] [place] [address] OR   • OTHER SURGICAL HISTORY      left knee surgery   • OTHER SURGICAL HISTORY tenderness  ; deffered by pt,   RECTAL:deffered by pt,   MUSCULOSKELETAL: back is not tender,FROM of the back  EXTREMITIES: no cyanosis, clubbing or edema  NEURO: Oriented times three,cranial nerves are intact,motor and sensory are grossly intact    Bloo

## 2019-06-29 NOTE — PATIENT INSTRUCTIONS
Continue cholesterol-lowering medication and aspirin. Recheck blood work at the beginning of August and follow-up in the office please schedule your appointment. Return on Monday for the cryotherapy of the lesion of the nose. Healthy diet.   Keep good hy

## 2019-07-01 ENCOUNTER — OFFICE VISIT (OUTPATIENT)
Dept: FAMILY MEDICINE CLINIC | Facility: CLINIC | Age: 59
End: 2019-07-01

## 2019-07-01 ENCOUNTER — MED REC SCAN ONLY (OUTPATIENT)
Dept: FAMILY MEDICINE CLINIC | Facility: CLINIC | Age: 59
End: 2019-07-01

## 2019-07-01 VITALS
HEART RATE: 56 BPM | OXYGEN SATURATION: 97 % | SYSTOLIC BLOOD PRESSURE: 108 MMHG | RESPIRATION RATE: 18 BRPM | HEIGHT: 69 IN | BODY MASS INDEX: 26.96 KG/M2 | DIASTOLIC BLOOD PRESSURE: 62 MMHG | WEIGHT: 182 LBS | TEMPERATURE: 97 F

## 2019-07-01 DIAGNOSIS — D22.9 MULTIPLE NEVI: Primary | ICD-10-CM

## 2019-07-01 PROCEDURE — 17110 DESTRUCTION B9 LES UP TO 14: CPT | Performed by: FAMILY MEDICINE

## 2019-07-01 NOTE — PROGRESS NOTES
James Will is a 60year old male. cc lesions on the face  HPI:   Patient is come to the office for cryotherapy of lesions of the face. He had cryotherapy performed before but they came back he would like to have it repeated.   Couple of those lesions ar Procedure note:   Patient was positioned on the table after informed consent was obtained, cryotherapy of the 5 lesions on the face was performed in usual fashion patient tolerated procedure well. Recommended to keep face clean.   He was on ibuprofen as

## 2019-07-30 ENCOUNTER — APPOINTMENT (OUTPATIENT)
Dept: LAB | Age: 59
End: 2019-07-30
Attending: FAMILY MEDICINE
Payer: COMMERCIAL

## 2019-07-30 DIAGNOSIS — Z00.00 LABORATORY EXAMINATION ORDERED AS PART OF A ROUTINE GENERAL MEDICAL EXAMINATION: ICD-10-CM

## 2019-07-30 DIAGNOSIS — E55.9 VITAMIN D DEFICIENCY: ICD-10-CM

## 2019-07-30 DIAGNOSIS — E78.00 PURE HYPERCHOLESTEROLEMIA: ICD-10-CM

## 2019-07-30 DIAGNOSIS — N52.9 ERECTILE DYSFUNCTION, UNSPECIFIED ERECTILE DYSFUNCTION TYPE: ICD-10-CM

## 2019-07-30 DIAGNOSIS — Z12.5 SCREENING FOR PROSTATE CANCER: ICD-10-CM

## 2019-07-30 LAB
ALBUMIN SERPL-MCNC: 3.7 G/DL (ref 3.4–5)
ALBUMIN/GLOB SERPL: 1.2 {RATIO} (ref 1–2)
ALP LIVER SERPL-CCNC: 58 U/L (ref 45–117)
ALT SERPL-CCNC: 35 U/L (ref 16–61)
ANION GAP SERPL CALC-SCNC: 7 MMOL/L (ref 0–18)
AST SERPL-CCNC: 21 U/L (ref 15–37)
BILIRUB SERPL-MCNC: 0.8 MG/DL (ref 0.1–2)
BILIRUB UR QL STRIP.AUTO: NEGATIVE
BUN BLD-MCNC: 14 MG/DL (ref 7–18)
BUN/CREAT SERPL: 17.9 (ref 10–20)
CALCIUM BLD-MCNC: 9 MG/DL (ref 8.5–10.1)
CHLORIDE SERPL-SCNC: 106 MMOL/L (ref 98–112)
CHOLEST SMN-MCNC: 134 MG/DL (ref ?–200)
CO2 SERPL-SCNC: 27 MMOL/L (ref 21–32)
COLOR UR AUTO: YELLOW
COMPLEXED PSA SERPL-MCNC: 1.07 NG/ML (ref ?–4)
CREAT BLD-MCNC: 0.78 MG/DL (ref 0.7–1.3)
GLOBULIN PLAS-MCNC: 3.2 G/DL (ref 2.8–4.4)
GLUCOSE BLD-MCNC: 91 MG/DL (ref 70–99)
GLUCOSE UR STRIP.AUTO-MCNC: NEGATIVE MG/DL
HDLC SERPL-MCNC: 61 MG/DL (ref 40–59)
KETONES UR STRIP.AUTO-MCNC: NEGATIVE MG/DL
LDLC SERPL CALC-MCNC: 59 MG/DL (ref ?–100)
LEUKOCYTE ESTERASE UR QL STRIP.AUTO: NEGATIVE
M PROTEIN MFR SERPL ELPH: 6.9 G/DL (ref 6.4–8.2)
NITRITE UR QL STRIP.AUTO: NEGATIVE
NONHDLC SERPL-MCNC: 73 MG/DL (ref ?–130)
OSMOLALITY SERPL CALC.SUM OF ELEC: 290 MOSM/KG (ref 275–295)
PH UR STRIP.AUTO: 7 [PH] (ref 4.5–8)
POTASSIUM SERPL-SCNC: 3.5 MMOL/L (ref 3.5–5.1)
PROT UR STRIP.AUTO-MCNC: NEGATIVE MG/DL
RBC UR QL AUTO: NEGATIVE
SODIUM SERPL-SCNC: 140 MMOL/L (ref 136–145)
SP GR UR STRIP.AUTO: 1.02 (ref 1–1.03)
TRIGL SERPL-MCNC: 69 MG/DL (ref 30–149)
UROBILINOGEN UR STRIP.AUTO-MCNC: <2 MG/DL
VIT D+METAB SERPL-MCNC: 23.3 NG/ML (ref 30–100)
VLDLC SERPL CALC-MCNC: 14 MG/DL (ref 0–30)

## 2019-07-30 PROCEDURE — 80061 LIPID PANEL: CPT

## 2019-07-30 PROCEDURE — 36415 COLL VENOUS BLD VENIPUNCTURE: CPT

## 2019-07-30 PROCEDURE — 82306 VITAMIN D 25 HYDROXY: CPT

## 2019-07-30 PROCEDURE — 81003 URINALYSIS AUTO W/O SCOPE: CPT

## 2019-07-30 PROCEDURE — 84402 ASSAY OF FREE TESTOSTERONE: CPT

## 2019-07-30 PROCEDURE — 80053 COMPREHEN METABOLIC PANEL: CPT

## 2019-07-30 PROCEDURE — 84403 ASSAY OF TOTAL TESTOSTERONE: CPT

## 2019-07-31 DIAGNOSIS — E55.9 VITAMIN D DEFICIENCY: Primary | ICD-10-CM

## 2019-07-31 RX ORDER — ERGOCALCIFEROL 1.25 MG/1
50000 CAPSULE ORAL WEEKLY
Qty: 12 CAPSULE | Refills: 0 | Status: SHIPPED | OUTPATIENT
Start: 2019-07-31 | End: 2019-08-30

## 2019-08-03 LAB
TESTOSTERONE TOTAL: 431.6 NG/DL
TESTOSTERONE, FREE -MS/MS: 66.1 PG/ML

## 2019-08-10 ENCOUNTER — OFFICE VISIT (OUTPATIENT)
Dept: FAMILY MEDICINE CLINIC | Facility: CLINIC | Age: 59
End: 2019-08-10

## 2019-08-10 VITALS
DIASTOLIC BLOOD PRESSURE: 52 MMHG | RESPIRATION RATE: 16 BRPM | BODY MASS INDEX: 25.48 KG/M2 | SYSTOLIC BLOOD PRESSURE: 104 MMHG | WEIGHT: 178 LBS | HEART RATE: 68 BPM | HEIGHT: 70 IN | TEMPERATURE: 97 F

## 2019-08-10 DIAGNOSIS — I65.1 VERTEBROBASILAR DOLICHOECTASIA: ICD-10-CM

## 2019-08-10 DIAGNOSIS — E55.9 VITAMIN D DEFICIENCY: ICD-10-CM

## 2019-08-10 DIAGNOSIS — E78.00 HYPERCHOLESTEREMIA: Primary | ICD-10-CM

## 2019-08-10 DIAGNOSIS — Z86.73 OLD PONTINE INFARCT WITHOUT LATE EFFECT: ICD-10-CM

## 2019-08-10 DIAGNOSIS — E04.1 THYROID NODULE: ICD-10-CM

## 2019-08-10 PROCEDURE — 99214 OFFICE O/P EST MOD 30 MIN: CPT | Performed by: FAMILY MEDICINE

## 2019-08-10 RX ORDER — ATORVASTATIN CALCIUM 10 MG/1
10 TABLET, FILM COATED ORAL NIGHTLY
Qty: 30 TABLET | Refills: 5 | Status: SHIPPED | OUTPATIENT
Start: 2019-08-10

## 2019-08-10 NOTE — PROGRESS NOTES
Felix Sal is a 60year old male. cc hypercholesterolemia, vitamin D deficiency, thyroid nodule, abnormal CTA brain  HPI:   Patient is coming for follow-up of hypercholesterolemia.   He was seen neurologist in May and was restarted on atorvastatin 10 mg rashes  HEENT no congestion no runny nose no sore throat  Neck no neck pain  RESPIRATORY: denies shortness of breath with exertion  CARDIOVASCULAR: denies chest pain on exertion  GI: denies abdominal pain and denies heartburn  NEURO: denies headaches  Psyc

## 2019-08-10 NOTE — PATIENT INSTRUCTIONS
Call [phone] to schedule  ultrasound of the thyroid. Continue atorvastatin daily. Follow-up with neurologist as scheduled. Healthy diet. Recheck blood work end of January/February and follow-up in the office for visit.

## 2019-08-11 ENCOUNTER — TELEPHONE (OUTPATIENT)
Dept: FAMILY MEDICINE CLINIC | Facility: CLINIC | Age: 59
End: 2019-08-11

## 2019-08-12 DIAGNOSIS — I65.1 VERTEBROBASILAR DOLICHOECTASIA: Primary | ICD-10-CM

## 2019-08-12 NOTE — TELEPHONE ENCOUNTER
Spoke with the pharmacist who states to disregard refill request.     Medication: ATORVASTATIN 10 MG Oral Tab    Date of last refill: 08/10/19 (#30/5)  [place]    Last office visit: 5/17/2019  Due back to clinic per

## 2019-08-13 RX ORDER — ATORVASTATIN CALCIUM 10 MG/1
TABLET, FILM COATED ORAL
Qty: 30 TABLET | Refills: 0 | OUTPATIENT
Start: 2019-08-13

## 2019-09-09 ENCOUNTER — OFFICE VISIT (OUTPATIENT)
Dept: NEUROLOGY | Facility: CLINIC | Age: 59
End: 2019-09-09

## 2019-09-09 VITALS
SYSTOLIC BLOOD PRESSURE: 120 MMHG | RESPIRATION RATE: 16 BRPM | DIASTOLIC BLOOD PRESSURE: 68 MMHG | BODY MASS INDEX: 26 KG/M2 | HEART RATE: 72 BPM | WEIGHT: 184 LBS

## 2019-09-09 DIAGNOSIS — I65.1 VERTEBROBASILAR DOLICHOECTASIA: Primary | ICD-10-CM

## 2019-09-09 PROCEDURE — 99213 OFFICE O/P EST LOW 20 MIN: CPT | Performed by: OTHER

## 2019-09-09 NOTE — PROGRESS NOTES
The patient denies any more dizziness or headaches. The patient also states he would like to discuss his imaging results.

## 2019-09-09 NOTE — PROGRESS NOTES
HPI:    Patient ID: Ebonie Ramirez is a 60year old male.   PCP: Dr Bree Romero    HPI       Patient is a 53-year-old right-handed male with past medical history of vertebrobasilar dolicho ectasia, hyperlipidemia who presented for evaluation of lacunar infa status: Never Smoker      Smokeless tobacco: Never Used    Alcohol use: Yes      Comment: wine on occasion    Drug use: No         Review of Systems   Constitutional: Negative. HENT: Negative. Eyes: Negative. Respiratory: Negative.     Lew Bey Symmetric palate elevation. Uvula in midline. XI: Normal sternocleidomastoid and trapezius strength. XII: Tongue is in midline with normal lateral movements.   Sensory : Intact to all modalities including light touch, pinprick, vibration and propriocept

## 2019-10-12 ENCOUNTER — TELEPHONE (OUTPATIENT)
Dept: FAMILY MEDICINE CLINIC | Facility: CLINIC | Age: 59
End: 2019-10-12

## 2020-02-19 ENCOUNTER — TELEPHONE (OUTPATIENT)
Dept: FAMILY MEDICINE CLINIC | Facility: CLINIC | Age: 60
End: 2020-02-19

## 2020-02-26 NOTE — TELEPHONE ENCOUNTER
Left voicemail for patient to call back to schedule this appointment. Sent unable to reach letter via [employer].

## 2020-08-24 ENCOUNTER — PATIENT OUTREACH (OUTPATIENT)
Dept: FAMILY MEDICINE CLINIC | Facility: CLINIC | Age: 60
End: 2020-08-24

## 2020-08-24 DIAGNOSIS — Z12.11 SCREEN FOR COLON CANCER: Primary | ICD-10-CM

## 2020-08-24 NOTE — PROGRESS NOTES
Per notes patient declines colonoscopy but agreeable to FIT card--he has not completed this. Please find out if he has card at home (if not--we can mail him this) and remind him to complete this.

## 2020-10-23 ENCOUNTER — TELEPHONE (OUTPATIENT)
Dept: FAMILY MEDICINE CLINIC | Facility: CLINIC | Age: 60
End: 2020-10-23

## 2020-10-23 NOTE — TELEPHONE ENCOUNTER
Patient's Son Tio Salinas called and states that Patient was tested at work yesterday for [place] and was called today and informed he was Positive for [place]. Son states he has been quarnatined and has nosymptoms at this time.

## 2020-10-23 NOTE — TELEPHONE ENCOUNTER
We will put patient on the calling list.  Patient should quarantine himself for 2 weeks since the test was positive and no symptoms. Monitor symptoms. We will call him again next week Tuesday or Wednesday with update.   If he would develop any symptoms in

## 2020-10-26 NOTE — TELEPHONE ENCOUNTER
· How are you feeling? Feels the same, no chest pain, no SOB  · Running any fever? Mild congestion  · Any continuing cough? No cough  · Any SOB or trouble breathing (with exertion or movement)? No   · Do you feel you are improving?   The same, not worsenin

## 2020-10-26 NOTE — TELEPHONE ENCOUNTER
Patient should quarantine himself for 2 weeks. We would like him to set up an appointment November 4 for  virtual visit to let him go to work at that time. We may move his regular appointment to the end of November.   We will keep calling patient as of a

## 2020-11-04 ENCOUNTER — TELEPHONE (OUTPATIENT)
Dept: FAMILY MEDICINE CLINIC | Facility: CLINIC | Age: 60
End: 2020-11-04

## 2020-11-04 ENCOUNTER — TELEMEDICINE (OUTPATIENT)
Dept: FAMILY MEDICINE CLINIC | Facility: CLINIC | Age: 60
End: 2020-11-04

## 2020-11-04 DIAGNOSIS — R50.9 FEVER, UNSPECIFIED FEVER CAUSE: ICD-10-CM

## 2020-11-04 DIAGNOSIS — U07.1 COVID-19 VIRUS INFECTION: Primary | ICD-10-CM

## 2020-11-04 DIAGNOSIS — R09.81 NASAL CONGESTION: ICD-10-CM

## 2020-11-04 PROCEDURE — 99213 OFFICE O/P EST LOW 20 MIN: CPT | Performed by: FAMILY MEDICINE

## 2020-11-04 NOTE — TELEPHONE ENCOUNTER
We can write him the note for his work to return to work  tomorrow or on Friday. Please let me know and I would like patient to give us the fax number so we can fax it directly to his work.   Thank you

## 2020-11-04 NOTE — TELEPHONE ENCOUNTER
Left a message for the to [employer] or call us with the fax number to his Employer so we can fax the return to work note.

## 2020-11-04 NOTE — TELEPHONE ENCOUNTER
Patient was told to call with his most recent COVID test results since getting off of his quarantine. Patient states he is negative.

## 2020-11-05 NOTE — PATIENT INSTRUCTIONS
Monitor symptoms. Keep good hydration. Take some vitamin C over-the-counter. Okay to return to work were mask and wash hands frequently when at work.

## 2020-11-05 NOTE — PROGRESS NOTES
Subjective      COVID-19 infection, cough congestion       HPI:     Telehealth outside of [address] Verbal Consent   I conducted a telehealth visit with Diane Mendoza today, 11/04/20, which was completed using two-way, real-time interactive pop audio. Patient is following up on COVID-19 infection patient says that he was tested at his work on October 22. They tested everybody at his work. Everybody came back positive for COVID-19.   Patient had fever at 37.3 degrees of Celsius also has some ru

## 2020-11-06 NOTE — TELEPHONE ENCOUNTER
Call to pt's cell reaches identified voice mail. Left vmm req call back to triage nurse Monday 11/9 w fax # for employer and date he will return to work for dr to send return to work note directly to [employer].     Call to christa/son-listed in [employer] consent-a

## 2020-11-14 ENCOUNTER — OFFICE VISIT (OUTPATIENT)
Dept: FAMILY MEDICINE CLINIC | Facility: CLINIC | Age: 60
End: 2020-11-14

## 2020-11-14 VITALS
TEMPERATURE: 97 F | BODY MASS INDEX: 26.05 KG/M2 | HEIGHT: 70.5 IN | SYSTOLIC BLOOD PRESSURE: 102 MMHG | WEIGHT: 184 LBS | DIASTOLIC BLOOD PRESSURE: 58 MMHG | RESPIRATION RATE: 16 BRPM | HEART RATE: 60 BPM

## 2020-11-14 DIAGNOSIS — Z00.00 LABORATORY TESTS ORDERED AS PART OF A COMPLETE PHYSICAL EXAM (CPE): ICD-10-CM

## 2020-11-14 DIAGNOSIS — E55.9 VITAMIN D DEFICIENCY: ICD-10-CM

## 2020-11-14 DIAGNOSIS — D17.9 LIPOMA, UNSPECIFIED SITE: ICD-10-CM

## 2020-11-14 DIAGNOSIS — E04.1 THYROID NODULE: ICD-10-CM

## 2020-11-14 DIAGNOSIS — Z00.00 PHYSICAL EXAM, ANNUAL: Primary | ICD-10-CM

## 2020-11-14 DIAGNOSIS — R94.6 ABNORMAL FINDING ON EXAMINATION OF THYROID GLAND: ICD-10-CM

## 2020-11-14 PROCEDURE — 3074F SYST BP LT 130 MM HG: CPT | Performed by: FAMILY MEDICINE

## 2020-11-14 PROCEDURE — 3008F BODY MASS INDEX DOCD: CPT | Performed by: FAMILY MEDICINE

## 2020-11-14 PROCEDURE — 99396 PREV VISIT EST AGE 40-64: CPT | Performed by: FAMILY MEDICINE

## 2020-11-14 PROCEDURE — 3078F DIAST BP <80 MM HG: CPT | Performed by: FAMILY MEDICINE

## 2020-11-14 NOTE — PROGRESS NOTES
Patricia Espinoza is a 63year old male who presents for a complete physical exam.   HPI:   Patient is doing well denies new complaints. Vitamin D was low in the past and not usually triggered work will check vitamin D level.     Patient decided not to have a Outpatient Medications   Medication Sig Dispense Refill   • atorvastatin 10 MG Oral Tab Take 1 tablet (10 mg total) by mouth nightly. 30 tablet 5   • aspirin 81 MG Oral Tab Take 81 mg by mouth daily.         Past Medical History:   Diagnosis Date   • Other nourished,in no apparent distress  SKIN: no rashes,no suspicious lesions  HEENT: atraumatic, normocephalic,ears and throat are clear  EYES:PERRLA, EOMI, conjunctiva are clear  NECK: supple,no adenopathy  CHEST: no chest tenderness  BREAST: no dominant or s

## 2020-11-14 NOTE — PATIENT INSTRUCTIONS
Call [phone] to schedule US thyroid and blood test.   Healthy diet. Stay active. Do test from stool for occult blood.

## 2021-01-02 ENCOUNTER — HOSPITAL ENCOUNTER (OUTPATIENT)
Dept: ULTRASOUND IMAGING | Age: 61
Discharge: HOME | End: 2021-01-02
Attending: FAMILY MEDICINE
Payer: COMMERCIAL

## 2021-01-02 DIAGNOSIS — E04.1 THYROID NODULE: ICD-10-CM

## 2021-01-02 PROCEDURE — 76536 US EXAM OF HEAD AND NECK: CPT | Performed by: FAMILY MEDICINE

## 2021-01-04 ENCOUNTER — LABORATORY ENCOUNTER (OUTPATIENT)
Dept: LAB | Age: 61
End: 2021-01-04
Attending: FAMILY MEDICINE
Payer: COMMERCIAL

## 2021-01-04 DIAGNOSIS — E55.9 VITAMIN D DEFICIENCY: ICD-10-CM

## 2021-01-04 DIAGNOSIS — Z00.00 LABORATORY TESTS ORDERED AS PART OF A COMPLETE PHYSICAL EXAM (CPE): ICD-10-CM

## 2021-01-04 LAB
BILIRUB UR QL STRIP.AUTO: NEGATIVE
CLARITY UR REFRACT.AUTO: CLEAR
COLOR UR AUTO: YELLOW
COMPLEXED PSA SERPL-MCNC: 1.26 NG/ML (ref ?–4)
GLUCOSE UR STRIP.AUTO-MCNC: NEGATIVE MG/DL
KETONES UR STRIP.AUTO-MCNC: NEGATIVE MG/DL
LEUKOCYTE ESTERASE UR QL STRIP.AUTO: NEGATIVE
NITRITE UR QL STRIP.AUTO: NEGATIVE
PH UR STRIP.AUTO: 6 [PH] (ref 4.5–8)
PROT UR STRIP.AUTO-MCNC: NEGATIVE MG/DL
RBC UR QL AUTO: NEGATIVE
SP GR UR STRIP.AUTO: 1.01 (ref 1–1.03)
UROBILINOGEN UR STRIP.AUTO-MCNC: <2 MG/DL
VIT D+METAB SERPL-MCNC: 26.8 NG/ML (ref 30–100)

## 2021-01-04 PROCEDURE — 81003 URINALYSIS AUTO W/O SCOPE: CPT

## 2021-01-04 PROCEDURE — 36415 COLL VENOUS BLD VENIPUNCTURE: CPT

## 2021-01-04 PROCEDURE — 82306 VITAMIN D 25 HYDROXY: CPT

## 2021-01-06 DIAGNOSIS — E55.9 VITAMIN D DEFICIENCY: Primary | ICD-10-CM

## 2021-01-06 RX ORDER — ERGOCALCIFEROL 1.25 MG/1
50000 CAPSULE ORAL WEEKLY
Qty: 12 CAPSULE | Refills: 0 | Status: SHIPPED | OUTPATIENT
Start: 2021-01-06 | End: 2021-03-25

## 2021-07-19 ENCOUNTER — OFFICE VISIT (OUTPATIENT)
Dept: SURGERY | Facility: CLINIC | Age: 61
End: 2021-07-19

## 2021-07-19 VITALS
HEIGHT: 70.5 IN | HEART RATE: 63 BPM | SYSTOLIC BLOOD PRESSURE: 103 MMHG | BODY MASS INDEX: 25.48 KG/M2 | DIASTOLIC BLOOD PRESSURE: 64 MMHG | WEIGHT: 180 LBS | TEMPERATURE: 97 F

## 2021-07-19 DIAGNOSIS — D48.1: Primary | ICD-10-CM

## 2021-07-19 DIAGNOSIS — Z01.818 PRE-OP TESTING: Primary | ICD-10-CM

## 2021-07-19 DIAGNOSIS — M79.89 SOFT TISSUE MASS: ICD-10-CM

## 2021-07-19 DIAGNOSIS — D48.1 NEOPLASM OF UNCERTAIN BEHAVIOR OF SOFT TISSUES OF ABDOMEN: ICD-10-CM

## 2021-07-19 DIAGNOSIS — D48.1 NEOPLASM OF UNCERTAIN BEHAVIOR OF SOFT TISSUES OF LOWER EXTREMITY: ICD-10-CM

## 2021-07-19 PROCEDURE — 99243 OFF/OP CNSLTJ NEW/EST LOW 30: CPT | Performed by: COLON & RECTAL SURGERY

## 2021-07-19 PROCEDURE — 3078F DIAST BP <80 MM HG: CPT | Performed by: COLON & RECTAL SURGERY

## 2021-07-19 PROCEDURE — 3008F BODY MASS INDEX DOCD: CPT | Performed by: COLON & RECTAL SURGERY

## 2021-07-19 PROCEDURE — 3074F SYST BP LT 130 MM HG: CPT | Performed by: COLON & RECTAL SURGERY

## 2021-07-19 NOTE — PATIENT INSTRUCTIONS
The patient presents today in consultation for new lumps that he has had appear on his arms and his back. The patient has had lipomas removed in the past by myself about 7 years ago.   The patient states that he has now noted some new lumps that have bee

## 2021-07-19 NOTE — H&P
New Patient Visit Note       Active Problems      1. 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 Neoplasm of uncertain behavior of soft tissues of lower extremity    3.  Neoplasm of uncertain behavior of soft tissues of abdomen performing the examination, counseling and education, ordering tests, referring and communicating with other healthcare professionals, documenting clinical information, independently interpreting results, coordinating care, and communication of any results has been reviewed by me during today. Review of Systems   Constitutional: Negative for chills, diaphoresis, fatigue, fever and unexpected weight change. HENT: Negative for hearing loss, nosebleeds, sore throat and trouble swallowing.     Respiratory: Neg Abdomen is not rigid. There is no shifting dullness, fluid wave or mass. Tenderness: There is no abdominal tenderness. There is no guarding or rebound. Hernia: No hernia is present. There is no hernia in the ventral area or left inguinal area. especially when he is at work. He states that they have been increasing in size. He  states that they are drainage, there is no surrounding erythema.     The patient has a past surgical history significant for excision of soft tissue mass in the past.  Th

## 2021-10-18 ENCOUNTER — LAB ENCOUNTER (OUTPATIENT)
Dept: LAB | Age: 61
End: 2021-10-18
Attending: COLON & RECTAL SURGERY
Payer: COMMERCIAL

## 2021-10-18 ENCOUNTER — EKG ENCOUNTER (OUTPATIENT)
Dept: LAB | Age: 61
End: 2021-10-18
Attending: COLON & RECTAL SURGERY
Payer: COMMERCIAL

## 2021-10-18 DIAGNOSIS — Z01.818 PRE-PROCEDURAL EXAMINATION: ICD-10-CM

## 2021-10-18 DIAGNOSIS — Z01.818 PRE-OP TESTING: ICD-10-CM

## 2021-10-18 PROCEDURE — 93010 ELECTROCARDIOGRAM REPORT: CPT | Performed by: INTERNAL MEDICINE

## 2021-10-18 PROCEDURE — 93005 ELECTROCARDIOGRAM TRACING: CPT

## 2021-10-21 ENCOUNTER — LAB REQUISITION (OUTPATIENT)
Dept: LAB | Facility: HOSPITAL | Age: 61
End: 2021-10-21
Payer: COMMERCIAL

## 2021-10-21 DIAGNOSIS — M79.89 OTHER SPECIFIED SOFT TISSUE DISORDERS: ICD-10-CM

## 2021-10-21 PROCEDURE — 88304 TISSUE EXAM BY PATHOLOGIST: CPT | Performed by: COLON & RECTAL SURGERY

## 2021-11-01 ENCOUNTER — OFFICE VISIT (OUTPATIENT)
Dept: SURGERY | Facility: CLINIC | Age: 61
End: 2021-11-01

## 2021-11-01 VITALS
HEIGHT: 70.5 IN | DIASTOLIC BLOOD PRESSURE: 84 MMHG | TEMPERATURE: 97 F | HEART RATE: 55 BPM | SYSTOLIC BLOOD PRESSURE: 120 MMHG | WEIGHT: 180 LBS | BODY MASS INDEX: 25.48 KG/M2

## 2021-11-01 DIAGNOSIS — D48.1 NEOPLASM OF UNCERTAIN BEHAVIOR OF SOFT TISSUES OF ABDOMEN: ICD-10-CM

## 2021-11-01 DIAGNOSIS — D48.1: ICD-10-CM

## 2021-11-01 DIAGNOSIS — M79.89 SOFT TISSUE MASS: Primary | ICD-10-CM

## 2021-11-01 DIAGNOSIS — D48.1 NEOPLASM OF UNCERTAIN BEHAVIOR OF SOFT TISSUES OF LOWER EXTREMITY: ICD-10-CM

## 2021-11-01 PROCEDURE — 99024 POSTOP FOLLOW-UP VISIT: CPT | Performed by: PHYSICIAN ASSISTANT

## 2021-11-01 PROCEDURE — 3074F SYST BP LT 130 MM HG: CPT | Performed by: PHYSICIAN ASSISTANT

## 2021-11-01 PROCEDURE — 3079F DIAST BP 80-89 MM HG: CPT | Performed by: PHYSICIAN ASSISTANT

## 2021-11-01 PROCEDURE — 3008F BODY MASS INDEX DOCD: CPT | Performed by: PHYSICIAN ASSISTANT

## 2021-11-01 NOTE — PATIENT INSTRUCTIONS
The patient presents today for continued care and evaluation after undergoing excision of multiple soft tissue lesions in October 21, 2021. The patient denies any trouble since having his operation.   He states that he did not have any fevers, chills, na

## 2021-11-01 NOTE — PROGRESS NOTES
Post Operative Visit Note       Active Problems  1. Soft tissue mass, lower back    2. Neoplasm of uncertain behavior of soft tissues of abdomen    3. 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  Neoplasm of uncertain behavior of and Sexual Activity      Alcohol use: Yes        Comment: wine on occasion      Drug use: No    Other Topics      Concerns:        Caffeine Concern: Yes          2-3 cups per day coffee        Exercise: Yes          treadmill at home 30 mins 2-3x/wk excision:  -Epidermoid cyst.     B. Soft tissue, right arm, excision:  -Angiolipoma.     C. Soft tissue, abdomen, excision:  -Angiolipoma.     D. Soft tissue, right thigh, excision:  -Angiolipoma.     E. Soft tissue, left arm, excision:  -Angiolipoma.

## 2022-07-15 ENCOUNTER — PATIENT OUTREACH (OUTPATIENT)
Dept: FAMILY MEDICINE CLINIC | Facility: CLINIC | Age: 62
End: 2022-07-15

## 2022-07-15 DIAGNOSIS — E78.00 PURE HYPERCHOLESTEROLEMIA: Primary | ICD-10-CM

## 2022-07-15 NOTE — PROGRESS NOTES
Pt outreached re: colon cancer screening  Pt advised to contact the office for colon cancer screening options.    Also pt is due to follow up for medications Speaking Coherently

## 2022-08-10 ENCOUNTER — PATIENT OUTREACH (OUTPATIENT)
Dept: FAMILY MEDICINE CLINIC | Facility: CLINIC | Age: 62
End: 2022-08-10

## 2022-08-10 NOTE — PROGRESS NOTES
Outreach made to the pt. Left a VM re colon cancer screening (FIT test). We were calling to see if the pt will do a FIT test at home? If yes he will need it mailed to him and completed by 08/31/2022.

## 2022-08-19 ENCOUNTER — PATIENT OUTREACH (OUTPATIENT)
Dept: FAMILY MEDICINE CLINIC | Facility: CLINIC | Age: 62
End: 2022-08-19

## 2022-08-19 DIAGNOSIS — Z12.11 ENCOUNTER FOR SCREENING FOR MALIGNANT NEOPLASM OF COLON: Primary | ICD-10-CM

## 2022-08-19 NOTE — PROGRESS NOTES
Outreach made to the pt. Left a VM re colon cancer screening (FIT test). We were calling to see if the pt will do a FIT test at home? If yes he will need it mailed to him and completed by 08/31/2022.     Mail off to the pt home

## 2022-09-25 ENCOUNTER — APPOINTMENT (OUTPATIENT)
Dept: LAB | Age: 62
End: 2022-09-25
Attending: FAMILY MEDICINE
Payer: COMMERCIAL

## 2022-10-03 ENCOUNTER — OFFICE VISIT (OUTPATIENT)
Dept: FAMILY MEDICINE CLINIC | Facility: CLINIC | Age: 62
End: 2022-10-03
Payer: COMMERCIAL

## 2022-10-03 VITALS
DIASTOLIC BLOOD PRESSURE: 72 MMHG | HEART RATE: 64 BPM | HEIGHT: 69.5 IN | RESPIRATION RATE: 14 BRPM | WEIGHT: 191 LBS | BODY MASS INDEX: 27.65 KG/M2 | SYSTOLIC BLOOD PRESSURE: 108 MMHG | TEMPERATURE: 97 F

## 2022-10-03 DIAGNOSIS — Z12.11 SCREEN FOR COLON CANCER: ICD-10-CM

## 2022-10-03 DIAGNOSIS — Z00.00 ANNUAL PHYSICAL EXAM: Primary | ICD-10-CM

## 2022-10-03 DIAGNOSIS — E78.00 HYPERCHOLESTEREMIA: ICD-10-CM

## 2022-10-03 DIAGNOSIS — E83.39 LOW PHOSPHATE LEVELS: ICD-10-CM

## 2022-10-03 DIAGNOSIS — R19.5 POSITIVE OCCULT STOOL BLOOD TEST: ICD-10-CM

## 2022-10-03 DIAGNOSIS — Z13.89 SCREENING FOR GENITOURINARY CONDITION: ICD-10-CM

## 2022-10-03 DIAGNOSIS — E78.00 PURE HYPERCHOLESTEROLEMIA: ICD-10-CM

## 2022-10-03 DIAGNOSIS — Z00.00 LABORATORY EXAMINATION ORDERED AS PART OF A ROUTINE GENERAL MEDICAL EXAMINATION: ICD-10-CM

## 2022-10-03 PROCEDURE — 3078F DIAST BP <80 MM HG: CPT | Performed by: FAMILY MEDICINE

## 2022-10-03 PROCEDURE — 99213 OFFICE O/P EST LOW 20 MIN: CPT | Performed by: FAMILY MEDICINE

## 2022-10-03 PROCEDURE — 99396 PREV VISIT EST AGE 40-64: CPT | Performed by: FAMILY MEDICINE

## 2022-10-03 PROCEDURE — 3008F BODY MASS INDEX DOCD: CPT | Performed by: FAMILY MEDICINE

## 2022-10-03 PROCEDURE — 3074F SYST BP LT 130 MM HG: CPT | Performed by: FAMILY MEDICINE

## 2022-10-03 RX ORDER — ATORVASTATIN CALCIUM 10 MG/1
10 TABLET, FILM COATED ORAL NIGHTLY
Qty: 30 TABLET | Refills: 3 | Status: SHIPPED | OUTPATIENT
Start: 2022-10-03

## 2022-10-03 RX ORDER — SILDENAFIL 50 MG/1
50 TABLET, FILM COATED ORAL AS NEEDED
COMMUNITY
Start: 2022-06-24

## 2022-10-03 NOTE — PATIENT INSTRUCTIONS
Shingrix - shingles shot. Restart atorvastatin 10 mg 1 tablet daily. Continue aspirin daily. Will check cholesterol blood work at the end of January and follow-up with me in the office after blood work. Schedule visit today. Low-fat diet. See GI doctor for colonoscopy.

## 2022-10-04 ENCOUNTER — LAB ENCOUNTER (OUTPATIENT)
Dept: LAB | Age: 62
End: 2022-10-04
Attending: FAMILY MEDICINE
Payer: COMMERCIAL

## 2022-10-04 DIAGNOSIS — E83.39 LOW PHOSPHATE LEVELS: ICD-10-CM

## 2022-10-04 DIAGNOSIS — Z13.89 SCREENING FOR GENITOURINARY CONDITION: ICD-10-CM

## 2022-10-04 DIAGNOSIS — E78.00 PURE HYPERCHOLESTEROLEMIA: ICD-10-CM

## 2022-10-04 DIAGNOSIS — Z00.00 ANNUAL PHYSICAL EXAM: ICD-10-CM

## 2022-10-04 LAB
ALBUMIN SERPL-MCNC: 3.8 G/DL (ref 3.4–5)
ALBUMIN/GLOB SERPL: 1.2 {RATIO} (ref 1–2)
ALP LIVER SERPL-CCNC: 60 U/L
ALT SERPL-CCNC: 38 U/L
ANION GAP SERPL CALC-SCNC: 6 MMOL/L (ref 0–18)
AST SERPL-CCNC: 19 U/L (ref 15–37)
BILIRUB SERPL-MCNC: 0.5 MG/DL (ref 0.1–2)
BILIRUB UR QL: NEGATIVE
BUN BLD-MCNC: 17 MG/DL (ref 7–18)
BUN/CREAT SERPL: 15.9 (ref 10–20)
CALCIUM BLD-MCNC: 9 MG/DL (ref 8.5–10.1)
CHLORIDE SERPL-SCNC: 109 MMOL/L (ref 98–112)
CHOLEST SERPL-MCNC: 214 MG/DL (ref ?–200)
CLARITY UR: CLEAR
CO2 SERPL-SCNC: 28 MMOL/L (ref 21–32)
COLOR UR: YELLOW
COMPLEXED PSA SERPL-MCNC: 1.43 NG/ML (ref ?–4)
CREAT BLD-MCNC: 1.07 MG/DL
FASTING PATIENT LIPID ANSWER: NO
FASTING STATUS PATIENT QL REPORTED: NO
GFR SERPLBLD BASED ON 1.73 SQ M-ARVRAT: 78 ML/MIN/1.73M2 (ref 60–?)
GLOBULIN PLAS-MCNC: 3.2 G/DL (ref 2.8–4.4)
GLUCOSE BLD-MCNC: 108 MG/DL (ref 70–99)
GLUCOSE UR-MCNC: NEGATIVE MG/DL
HDLC SERPL-MCNC: 48 MG/DL (ref 40–59)
HGB UR QL STRIP.AUTO: NEGATIVE
KETONES UR-MCNC: NEGATIVE MG/DL
LDLC SERPL CALC-MCNC: 119 MG/DL (ref ?–100)
LEUKOCYTE ESTERASE UR QL STRIP.AUTO: NEGATIVE
NITRITE UR QL STRIP.AUTO: NEGATIVE
NONHDLC SERPL-MCNC: 166 MG/DL (ref ?–130)
OSMOLALITY SERPL CALC.SUM OF ELEC: 298 MOSM/KG (ref 275–295)
PH UR: 6 [PH] (ref 5–8)
PHOSPHATE SERPL-MCNC: 2.9 MG/DL (ref 2.5–4.9)
POTASSIUM SERPL-SCNC: 4.1 MMOL/L (ref 3.5–5.1)
PROT SERPL-MCNC: 7 G/DL (ref 6.4–8.2)
PROT UR-MCNC: NEGATIVE MG/DL
SODIUM SERPL-SCNC: 143 MMOL/L (ref 136–145)
SP GR UR STRIP: 1.01 (ref 1–1.03)
TRIGL SERPL-MCNC: 270 MG/DL (ref 30–149)
TSI SER-ACNC: 1.46 MIU/ML (ref 0.36–3.74)
UROBILINOGEN UR STRIP-ACNC: 0.2
VLDLC SERPL CALC-MCNC: 48 MG/DL (ref 0–30)

## 2022-10-04 PROCEDURE — 80061 LIPID PANEL: CPT

## 2022-10-04 PROCEDURE — 84443 ASSAY THYROID STIM HORMONE: CPT

## 2022-10-04 PROCEDURE — 81003 URINALYSIS AUTO W/O SCOPE: CPT

## 2022-10-04 PROCEDURE — 84100 ASSAY OF PHOSPHORUS: CPT

## 2022-10-04 PROCEDURE — 80053 COMPREHEN METABOLIC PANEL: CPT

## 2022-10-06 DIAGNOSIS — R73.9 ELEVATED BLOOD SUGAR: ICD-10-CM

## 2022-10-06 DIAGNOSIS — E78.00 HYPERCHOLESTEREMIA: Primary | ICD-10-CM

## 2022-10-11 ENCOUNTER — MED REC SCAN ONLY (OUTPATIENT)
Dept: FAMILY MEDICINE CLINIC | Facility: CLINIC | Age: 62
End: 2022-10-11

## 2022-10-13 ENCOUNTER — TELEPHONE (OUTPATIENT)
Dept: ADMINISTRATIVE | Age: 62
End: 2022-10-13

## 2022-10-13 DIAGNOSIS — R19.5 OCCULT BLOOD POSITIVE STOOL: ICD-10-CM

## 2022-10-13 DIAGNOSIS — K64.9 HEMORRHOIDS, UNSPECIFIED HEMORRHOID TYPE: Primary | ICD-10-CM

## 2022-10-13 NOTE — TELEPHONE ENCOUNTER
Patient requested a new referral to see General surgeon Carroll Coronel,Please review and sign plan and care if you agree Thank you. Miguel Mcneil V    [facility].

## 2022-10-21 PROBLEM — D12.4 BENIGN NEOPLASM OF DESCENDING COLON: Status: ACTIVE | Noted: 2022-10-21

## 2022-10-21 PROBLEM — D12.5 BENIGN NEOPLASM OF SIGMOID COLON: Status: ACTIVE | Noted: 2022-10-21

## 2022-10-21 PROBLEM — D12.0 BENIGN NEOPLASM OF CECUM: Status: ACTIVE | Noted: 2022-10-21

## 2022-10-21 PROBLEM — R19.5 OCCULT BLOOD IN STOOLS: Status: ACTIVE | Noted: 2022-10-21

## 2022-10-21 PROCEDURE — 88305 TISSUE EXAM BY PATHOLOGIST: CPT | Performed by: INTERNAL MEDICINE

## 2022-11-25 ENCOUNTER — APPOINTMENT (OUTPATIENT)
Dept: ULTRASOUND IMAGING | Facility: HOSPITAL | Age: 62
End: 2022-11-25
Attending: EMERGENCY MEDICINE
Payer: COMMERCIAL

## 2022-11-25 ENCOUNTER — HOSPITAL ENCOUNTER (OUTPATIENT)
Facility: HOSPITAL | Age: 62
Setting detail: OBSERVATION
Discharge: HOME | End: 2022-11-26
Attending: EMERGENCY MEDICINE | Admitting: STUDENT IN AN ORGANIZED HEALTH CARE EDUCATION/TRAINING PROGRAM
Payer: COMMERCIAL

## 2022-11-25 DIAGNOSIS — K81.0 ACUTE CHOLECYSTITIS: Primary | ICD-10-CM

## 2022-11-25 LAB
ALBUMIN SERPL-MCNC: 3 G/DL (ref 3.4–5)
ALBUMIN/GLOB SERPL: 1.2 {RATIO} (ref 1–2)
ALP LIVER SERPL-CCNC: 44 U/L
ALT SERPL-CCNC: 33 U/L
ANION GAP SERPL CALC-SCNC: 5 MMOL/L (ref 0–18)
AST SERPL-CCNC: 19 U/L (ref 15–37)
BASOPHILS # BLD AUTO: 0.03 X10(3) UL (ref 0–0.2)
BASOPHILS NFR BLD AUTO: 0.2 %
BILIRUB SERPL-MCNC: 0.4 MG/DL (ref 0.1–2)
BUN BLD-MCNC: 15 MG/DL (ref 7–18)
CALCIUM BLD-MCNC: 7.1 MG/DL (ref 8.5–10.1)
CHLORIDE SERPL-SCNC: 116 MMOL/L (ref 98–112)
CO2 SERPL-SCNC: 23 MMOL/L (ref 21–32)
CREAT BLD-MCNC: 0.63 MG/DL
EOSINOPHIL # BLD AUTO: 0 X10(3) UL (ref 0–0.7)
EOSINOPHIL NFR BLD AUTO: 0 %
ERYTHROCYTE [DISTWIDTH] IN BLOOD BY AUTOMATED COUNT: 12.1 %
GFR SERPLBLD BASED ON 1.73 SQ M-ARVRAT: 107 ML/MIN/1.73M2 (ref 60–?)
GLOBULIN PLAS-MCNC: 2.6 G/DL (ref 2.8–4.4)
GLUCOSE BLD-MCNC: 132 MG/DL (ref 70–99)
HCT VFR BLD AUTO: 46.3 %
HGB BLD-MCNC: 15.7 G/DL
IMM GRANULOCYTES # BLD AUTO: 0.05 X10(3) UL (ref 0–1)
IMM GRANULOCYTES NFR BLD: 0.3 %
LIPASE SERPL-CCNC: 54 U/L (ref 73–393)
LYMPHOCYTES # BLD AUTO: 0.58 X10(3) UL (ref 1–4)
LYMPHOCYTES NFR BLD AUTO: 3.3 %
MCH RBC QN AUTO: 31.6 PG (ref 26–34)
MCHC RBC AUTO-ENTMCNC: 33.9 G/DL (ref 31–37)
MCV RBC AUTO: 93.2 FL
MONOCYTES # BLD AUTO: 0.45 X10(3) UL (ref 0.1–1)
MONOCYTES NFR BLD AUTO: 2.6 %
NEUTROPHILS # BLD AUTO: 16.31 X10 (3) UL (ref 1.5–7.7)
NEUTROPHILS # BLD AUTO: 16.31 X10(3) UL (ref 1.5–7.7)
NEUTROPHILS NFR BLD AUTO: 93.6 %
OSMOLALITY SERPL CALC.SUM OF ELEC: 301 MOSM/KG (ref 275–295)
PLATELET # BLD AUTO: 200 10(3)UL (ref 150–450)
POTASSIUM SERPL-SCNC: 2.7 MMOL/L (ref 3.5–5.1)
PROT SERPL-MCNC: 5.6 G/DL (ref 6.4–8.2)
RBC # BLD AUTO: 4.97 X10(6)UL
SODIUM SERPL-SCNC: 144 MMOL/L (ref 136–145)
TROPONIN I HIGH SENSITIVITY: 50 NG/L
WBC # BLD AUTO: 17.4 X10(3) UL (ref 4–11)

## 2022-11-25 PROCEDURE — 96366 THER/PROPH/DIAG IV INF ADDON: CPT

## 2022-11-25 PROCEDURE — 96365 THER/PROPH/DIAG IV INF INIT: CPT

## 2022-11-25 PROCEDURE — 80053 COMPREHEN METABOLIC PANEL: CPT | Performed by: EMERGENCY MEDICINE

## 2022-11-25 PROCEDURE — 99285 EMERGENCY DEPT VISIT HI MDM: CPT

## 2022-11-25 PROCEDURE — 93010 ELECTROCARDIOGRAM REPORT: CPT

## 2022-11-25 PROCEDURE — 76700 US EXAM ABDOM COMPLETE: CPT | Performed by: EMERGENCY MEDICINE

## 2022-11-25 PROCEDURE — 85025 COMPLETE CBC W/AUTO DIFF WBC: CPT | Performed by: EMERGENCY MEDICINE

## 2022-11-25 PROCEDURE — 84484 ASSAY OF TROPONIN QUANT: CPT | Performed by: EMERGENCY MEDICINE

## 2022-11-25 PROCEDURE — 93005 ELECTROCARDIOGRAM TRACING: CPT

## 2022-11-25 PROCEDURE — 96376 TX/PRO/DX INJ SAME DRUG ADON: CPT

## 2022-11-25 PROCEDURE — 83690 ASSAY OF LIPASE: CPT | Performed by: EMERGENCY MEDICINE

## 2022-11-25 PROCEDURE — 96375 TX/PRO/DX INJ NEW DRUG ADDON: CPT

## 2022-11-25 PROCEDURE — 96367 TX/PROPH/DG ADDL SEQ IV INF: CPT

## 2022-11-25 RX ORDER — KETOROLAC TROMETHAMINE 15 MG/ML
15 INJECTION, SOLUTION INTRAMUSCULAR; INTRAVENOUS ONCE
Status: COMPLETED | OUTPATIENT
Start: 2022-11-25 | End: 2022-11-25

## 2022-11-25 RX ORDER — ONDANSETRON 2 MG/ML
4 INJECTION INTRAMUSCULAR; INTRAVENOUS ONCE
Status: COMPLETED | OUTPATIENT
Start: 2022-11-25 | End: 2022-11-25

## 2022-11-25 RX ORDER — MORPHINE SULFATE 4 MG/ML
4 INJECTION, SOLUTION INTRAMUSCULAR; INTRAVENOUS EVERY 30 MIN PRN
Status: DISCONTINUED | OUTPATIENT
Start: 2022-11-25 | End: 2022-11-26

## 2022-11-26 ENCOUNTER — APPOINTMENT (OUTPATIENT)
Dept: GENERAL RADIOLOGY | Facility: HOSPITAL | Age: 62
End: 2022-11-26
Attending: SURGERY
Payer: COMMERCIAL

## 2022-11-26 ENCOUNTER — ANESTHESIA EVENT (OUTPATIENT)
Dept: SURGERY | Facility: HOSPITAL | Age: 62
End: 2022-11-26
Payer: COMMERCIAL

## 2022-11-26 ENCOUNTER — ANESTHESIA (OUTPATIENT)
Dept: SURGERY | Facility: HOSPITAL | Age: 62
End: 2022-11-26
Payer: COMMERCIAL

## 2022-11-26 VITALS
TEMPERATURE: 98 F | DIASTOLIC BLOOD PRESSURE: 54 MMHG | OXYGEN SATURATION: 97 % | HEART RATE: 59 BPM | HEIGHT: 72 IN | WEIGHT: 193.81 LBS | RESPIRATION RATE: 17 BRPM | BODY MASS INDEX: 26.25 KG/M2 | SYSTOLIC BLOOD PRESSURE: 109 MMHG

## 2022-11-26 PROBLEM — R79.89 AZOTEMIA: Status: ACTIVE | Noted: 2022-11-26

## 2022-11-26 PROBLEM — D72.829 LEUKOCYTOSIS: Status: ACTIVE | Noted: 2022-11-26

## 2022-11-26 PROBLEM — E87.6 HYPOKALEMIA: Status: ACTIVE | Noted: 2022-11-26

## 2022-11-26 PROBLEM — K81.0 ACUTE CHOLECYSTITIS: Status: ACTIVE | Noted: 2022-11-26

## 2022-11-26 PROBLEM — R73.9 HYPERGLYCEMIA: Status: ACTIVE | Noted: 2022-11-26

## 2022-11-26 LAB
ALBUMIN SERPL-MCNC: 3.1 G/DL (ref 3.4–5)
ALBUMIN/GLOB SERPL: 1.1 {RATIO} (ref 1–2)
ALP LIVER SERPL-CCNC: 48 U/L
ALT SERPL-CCNC: 37 U/L
ANION GAP SERPL CALC-SCNC: 4 MMOL/L (ref 0–18)
AST SERPL-CCNC: 20 U/L (ref 15–37)
ATRIAL RATE: 71 BPM
BILIRUB SERPL-MCNC: 0.9 MG/DL (ref 0.1–2)
BUN BLD-MCNC: 15 MG/DL (ref 7–18)
CALCIUM BLD-MCNC: 8.4 MG/DL (ref 8.5–10.1)
CHLORIDE SERPL-SCNC: 111 MMOL/L (ref 98–112)
CO2 SERPL-SCNC: 27 MMOL/L (ref 21–32)
CREAT BLD-MCNC: 0.86 MG/DL
ERYTHROCYTE [DISTWIDTH] IN BLOOD BY AUTOMATED COUNT: 12.1 %
GFR SERPLBLD BASED ON 1.73 SQ M-ARVRAT: 97 ML/MIN/1.73M2 (ref 60–?)
GLOBULIN PLAS-MCNC: 2.9 G/DL (ref 2.8–4.4)
GLUCOSE BLD-MCNC: 121 MG/DL (ref 70–99)
HCT VFR BLD AUTO: 42 %
HGB BLD-MCNC: 14.1 G/DL
MCH RBC QN AUTO: 32.1 PG (ref 26–34)
MCHC RBC AUTO-ENTMCNC: 33.6 G/DL (ref 31–37)
MCV RBC AUTO: 95.7 FL
OSMOLALITY SERPL CALC.SUM OF ELEC: 296 MOSM/KG (ref 275–295)
P AXIS: 60 DEGREES
P-R INTERVAL: 148 MS
PLATELET # BLD AUTO: 166 10(3)UL (ref 150–450)
POTASSIUM SERPL-SCNC: 4.3 MMOL/L (ref 3.5–5.1)
PROT SERPL-MCNC: 6 G/DL (ref 6.4–8.2)
Q-T INTERVAL: 404 MS
QRS DURATION: 94 MS
QTC CALCULATION (BEZET): 439 MS
R AXIS: -25 DEGREES
RBC # BLD AUTO: 4.39 X10(6)UL
SARS-COV-2 RNA RESP QL NAA+PROBE: NOT DETECTED
SODIUM SERPL-SCNC: 142 MMOL/L (ref 136–145)
T AXIS: 48 DEGREES
VENTRICULAR RATE: 71 BPM
WBC # BLD AUTO: 18.4 X10(3) UL (ref 4–11)

## 2022-11-26 PROCEDURE — 0FT44ZZ RESECTION OF GALLBLADDER, PERCUTANEOUS ENDOSCOPIC APPROACH: ICD-10-PCS | Performed by: SURGERY

## 2022-11-26 PROCEDURE — 74300 X-RAY BILE DUCTS/PANCREAS: CPT | Performed by: SURGERY

## 2022-11-26 PROCEDURE — S0028 INJECTION, FAMOTIDINE, 20 MG: HCPCS | Performed by: SURGERY

## 2022-11-26 PROCEDURE — 85027 COMPLETE CBC AUTOMATED: CPT | Performed by: STUDENT IN AN ORGANIZED HEALTH CARE EDUCATION/TRAINING PROGRAM

## 2022-11-26 PROCEDURE — 80053 COMPREHEN METABOLIC PANEL: CPT | Performed by: STUDENT IN AN ORGANIZED HEALTH CARE EDUCATION/TRAINING PROGRAM

## 2022-11-26 RX ORDER — PROCHLORPERAZINE EDISYLATE 5 MG/ML
5 INJECTION INTRAMUSCULAR; INTRAVENOUS EVERY 8 HOURS PRN
Status: DISCONTINUED | OUTPATIENT
Start: 2022-11-26 | End: 2022-11-26 | Stop reason: HOSPADM

## 2022-11-26 RX ORDER — HYDROMORPHONE HYDROCHLORIDE 1 MG/ML
0.2 INJECTION, SOLUTION INTRAMUSCULAR; INTRAVENOUS; SUBCUTANEOUS EVERY 5 MIN PRN
Status: DISCONTINUED | OUTPATIENT
Start: 2022-11-26 | End: 2022-11-26 | Stop reason: HOSPADM

## 2022-11-26 RX ORDER — SODIUM CHLORIDE 9 MG/ML
1000 INJECTION, SOLUTION INTRAVENOUS CONTINUOUS
Status: DISCONTINUED | OUTPATIENT
Start: 2022-11-26 | End: 2022-11-26

## 2022-11-26 RX ORDER — ACETAMINOPHEN 500 MG
1000 TABLET ORAL EVERY 8 HOURS SCHEDULED
Status: DISCONTINUED | OUTPATIENT
Start: 2022-11-26 | End: 2022-11-26

## 2022-11-26 RX ORDER — PROCHLORPERAZINE EDISYLATE 5 MG/ML
5 INJECTION INTRAMUSCULAR; INTRAVENOUS EVERY 8 HOURS PRN
Status: DISCONTINUED | OUTPATIENT
Start: 2022-11-26 | End: 2022-11-26

## 2022-11-26 RX ORDER — ONDANSETRON 2 MG/ML
4 INJECTION INTRAMUSCULAR; INTRAVENOUS EVERY 6 HOURS PRN
Status: DISCONTINUED | OUTPATIENT
Start: 2022-11-26 | End: 2022-11-26 | Stop reason: HOSPADM

## 2022-11-26 RX ORDER — NALOXONE HYDROCHLORIDE 0.4 MG/ML
80 INJECTION, SOLUTION INTRAMUSCULAR; INTRAVENOUS; SUBCUTANEOUS AS NEEDED
Status: DISCONTINUED | OUTPATIENT
Start: 2022-11-26 | End: 2022-11-26 | Stop reason: HOSPADM

## 2022-11-26 RX ORDER — HYDROMORPHONE HYDROCHLORIDE 1 MG/ML
0.2 INJECTION, SOLUTION INTRAMUSCULAR; INTRAVENOUS; SUBCUTANEOUS EVERY 2 HOUR PRN
Status: DISCONTINUED | OUTPATIENT
Start: 2022-11-26 | End: 2022-11-26

## 2022-11-26 RX ORDER — FAMOTIDINE 10 MG/ML
20 INJECTION, SOLUTION INTRAVENOUS DAILY
Status: DISCONTINUED | OUTPATIENT
Start: 2022-11-26 | End: 2022-11-26

## 2022-11-26 RX ORDER — PHENYLEPHRINE HCL 10 MG/ML
VIAL (ML) INJECTION AS NEEDED
Status: DISCONTINUED | OUTPATIENT
Start: 2022-11-26 | End: 2022-11-26 | Stop reason: SURG

## 2022-11-26 RX ORDER — GLYCOPYRROLATE 0.2 MG/ML
INJECTION, SOLUTION INTRAMUSCULAR; INTRAVENOUS AS NEEDED
Status: DISCONTINUED | OUTPATIENT
Start: 2022-11-26 | End: 2022-11-26 | Stop reason: SURG

## 2022-11-26 RX ORDER — HYDROMORPHONE HYDROCHLORIDE 1 MG/ML
0.4 INJECTION, SOLUTION INTRAMUSCULAR; INTRAVENOUS; SUBCUTANEOUS EVERY 2 HOUR PRN
Status: DISCONTINUED | OUTPATIENT
Start: 2022-11-26 | End: 2022-11-26

## 2022-11-26 RX ORDER — OXYCODONE HYDROCHLORIDE 5 MG/1
2.5 TABLET ORAL EVERY 4 HOURS PRN
Status: DISCONTINUED | OUTPATIENT
Start: 2022-11-26 | End: 2022-11-26

## 2022-11-26 RX ORDER — METOPROLOL TARTRATE 5 MG/5ML
2.5 INJECTION INTRAVENOUS ONCE
Status: DISCONTINUED | OUTPATIENT
Start: 2022-11-26 | End: 2022-11-26 | Stop reason: HOSPADM

## 2022-11-26 RX ORDER — CEFOXITIN 2 G/1
INJECTION, POWDER, FOR SOLUTION INTRAVENOUS AS NEEDED
Status: DISCONTINUED | OUTPATIENT
Start: 2022-11-26 | End: 2022-11-26 | Stop reason: SURG

## 2022-11-26 RX ORDER — LABETALOL HYDROCHLORIDE 5 MG/ML
5 INJECTION, SOLUTION INTRAVENOUS EVERY 5 MIN PRN
Status: DISCONTINUED | OUTPATIENT
Start: 2022-11-26 | End: 2022-11-26 | Stop reason: HOSPADM

## 2022-11-26 RX ORDER — HYDROMORPHONE HYDROCHLORIDE 1 MG/ML
1 INJECTION, SOLUTION INTRAMUSCULAR; INTRAVENOUS; SUBCUTANEOUS EVERY 2 HOUR PRN
Status: DISCONTINUED | OUTPATIENT
Start: 2022-11-26 | End: 2022-11-26

## 2022-11-26 RX ORDER — HYDROCODONE BITARTRATE AND ACETAMINOPHEN 5; 325 MG/1; MG/1
2 TABLET ORAL ONCE AS NEEDED
Status: DISCONTINUED | OUTPATIENT
Start: 2022-11-26 | End: 2022-11-26 | Stop reason: HOSPADM

## 2022-11-26 RX ORDER — HYDROMORPHONE HYDROCHLORIDE 1 MG/ML
0.6 INJECTION, SOLUTION INTRAMUSCULAR; INTRAVENOUS; SUBCUTANEOUS EVERY 5 MIN PRN
Status: DISCONTINUED | OUTPATIENT
Start: 2022-11-26 | End: 2022-11-26 | Stop reason: HOSPADM

## 2022-11-26 RX ORDER — MIDAZOLAM HYDROCHLORIDE 1 MG/ML
INJECTION INTRAMUSCULAR; INTRAVENOUS AS NEEDED
Status: DISCONTINUED | OUTPATIENT
Start: 2022-11-26 | End: 2022-11-26 | Stop reason: SURG

## 2022-11-26 RX ORDER — KETOROLAC TROMETHAMINE 30 MG/ML
INJECTION, SOLUTION INTRAMUSCULAR; INTRAVENOUS AS NEEDED
Status: DISCONTINUED | OUTPATIENT
Start: 2022-11-26 | End: 2022-11-26 | Stop reason: SURG

## 2022-11-26 RX ORDER — ACETAMINOPHEN 500 MG
1000 TABLET ORAL ONCE AS NEEDED
Status: DISCONTINUED | OUTPATIENT
Start: 2022-11-26 | End: 2022-11-26 | Stop reason: HOSPADM

## 2022-11-26 RX ORDER — FAMOTIDINE 20 MG/1
20 TABLET, FILM COATED ORAL DAILY
Status: DISCONTINUED | OUTPATIENT
Start: 2022-11-26 | End: 2022-11-26

## 2022-11-26 RX ORDER — HYDROMORPHONE HYDROCHLORIDE 1 MG/ML
0.4 INJECTION, SOLUTION INTRAMUSCULAR; INTRAVENOUS; SUBCUTANEOUS EVERY 5 MIN PRN
Status: DISCONTINUED | OUTPATIENT
Start: 2022-11-26 | End: 2022-11-26 | Stop reason: HOSPADM

## 2022-11-26 RX ORDER — HYDROMORPHONE HYDROCHLORIDE 1 MG/ML
0.8 INJECTION, SOLUTION INTRAMUSCULAR; INTRAVENOUS; SUBCUTANEOUS EVERY 2 HOUR PRN
Status: DISCONTINUED | OUTPATIENT
Start: 2022-11-26 | End: 2022-11-26

## 2022-11-26 RX ORDER — ROCURONIUM BROMIDE 10 MG/ML
INJECTION, SOLUTION INTRAVENOUS AS NEEDED
Status: DISCONTINUED | OUTPATIENT
Start: 2022-11-26 | End: 2022-11-26 | Stop reason: SURG

## 2022-11-26 RX ORDER — HYDROCODONE BITARTRATE AND ACETAMINOPHEN 5; 325 MG/1; MG/1
1 TABLET ORAL EVERY 6 HOURS PRN
Qty: 20 TABLET | Refills: 0 | Status: SHIPPED | OUTPATIENT
Start: 2022-11-26

## 2022-11-26 RX ORDER — BUPIVACAINE HYDROCHLORIDE AND EPINEPHRINE 5; 5 MG/ML; UG/ML
INJECTION, SOLUTION EPIDURAL; INTRACAUDAL; PERINEURAL AS NEEDED
Status: DISCONTINUED | OUTPATIENT
Start: 2022-11-26 | End: 2022-11-26 | Stop reason: HOSPADM

## 2022-11-26 RX ORDER — OXYCODONE HYDROCHLORIDE 5 MG/1
5 TABLET ORAL EVERY 4 HOURS PRN
Status: DISCONTINUED | OUTPATIENT
Start: 2022-11-26 | End: 2022-11-26

## 2022-11-26 RX ORDER — MEPERIDINE HYDROCHLORIDE 25 MG/ML
12.5 INJECTION INTRAMUSCULAR; INTRAVENOUS; SUBCUTANEOUS AS NEEDED
Status: DISCONTINUED | OUTPATIENT
Start: 2022-11-26 | End: 2022-11-26 | Stop reason: HOSPADM

## 2022-11-26 RX ORDER — LIDOCAINE HYDROCHLORIDE 10 MG/ML
INJECTION, SOLUTION EPIDURAL; INFILTRATION; INTRACAUDAL; PERINEURAL AS NEEDED
Status: DISCONTINUED | OUTPATIENT
Start: 2022-11-26 | End: 2022-11-26 | Stop reason: SURG

## 2022-11-26 RX ORDER — ENOXAPARIN SODIUM 100 MG/ML
40 INJECTION SUBCUTANEOUS DAILY
Status: DISCONTINUED | OUTPATIENT
Start: 2022-11-26 | End: 2022-11-26

## 2022-11-26 RX ORDER — ONDANSETRON 2 MG/ML
INJECTION INTRAMUSCULAR; INTRAVENOUS AS NEEDED
Status: DISCONTINUED | OUTPATIENT
Start: 2022-11-26 | End: 2022-11-26 | Stop reason: SURG

## 2022-11-26 RX ORDER — NEOSTIGMINE METHYLSULFATE 1 MG/ML
INJECTION, SOLUTION INTRAVENOUS AS NEEDED
Status: DISCONTINUED | OUTPATIENT
Start: 2022-11-26 | End: 2022-11-26 | Stop reason: SURG

## 2022-11-26 RX ORDER — SODIUM CHLORIDE, SODIUM LACTATE, POTASSIUM CHLORIDE, CALCIUM CHLORIDE 600; 310; 30; 20 MG/100ML; MG/100ML; MG/100ML; MG/100ML
INJECTION, SOLUTION INTRAVENOUS CONTINUOUS
Status: DISCONTINUED | OUTPATIENT
Start: 2022-11-26 | End: 2022-11-26

## 2022-11-26 RX ORDER — SODIUM CHLORIDE, SODIUM LACTATE, POTASSIUM CHLORIDE, CALCIUM CHLORIDE 600; 310; 30; 20 MG/100ML; MG/100ML; MG/100ML; MG/100ML
INJECTION, SOLUTION INTRAVENOUS CONTINUOUS
Status: DISCONTINUED | OUTPATIENT
Start: 2022-11-26 | End: 2022-11-26 | Stop reason: HOSPADM

## 2022-11-26 RX ORDER — ONDANSETRON 2 MG/ML
4 INJECTION INTRAMUSCULAR; INTRAVENOUS EVERY 6 HOURS PRN
Status: DISCONTINUED | OUTPATIENT
Start: 2022-11-26 | End: 2022-11-26

## 2022-11-26 RX ORDER — MIDAZOLAM HYDROCHLORIDE 1 MG/ML
1 INJECTION INTRAMUSCULAR; INTRAVENOUS EVERY 5 MIN PRN
Status: DISCONTINUED | OUTPATIENT
Start: 2022-11-26 | End: 2022-11-26 | Stop reason: HOSPADM

## 2022-11-26 RX ORDER — DEXAMETHASONE SODIUM PHOSPHATE 4 MG/ML
VIAL (ML) INJECTION AS NEEDED
Status: DISCONTINUED | OUTPATIENT
Start: 2022-11-26 | End: 2022-11-26 | Stop reason: SURG

## 2022-11-26 RX ORDER — HYDROCODONE BITARTRATE AND ACETAMINOPHEN 5; 325 MG/1; MG/1
1 TABLET ORAL ONCE AS NEEDED
Status: DISCONTINUED | OUTPATIENT
Start: 2022-11-26 | End: 2022-11-26 | Stop reason: HOSPADM

## 2022-11-26 RX ORDER — SODIUM CHLORIDE, SODIUM LACTATE, POTASSIUM CHLORIDE, CALCIUM CHLORIDE 600; 310; 30; 20 MG/100ML; MG/100ML; MG/100ML; MG/100ML
INJECTION, SOLUTION INTRAVENOUS CONTINUOUS PRN
Status: DISCONTINUED | OUTPATIENT
Start: 2022-11-26 | End: 2022-11-26 | Stop reason: SURG

## 2022-11-26 RX ADMIN — SODIUM CHLORIDE, SODIUM LACTATE, POTASSIUM CHLORIDE, CALCIUM CHLORIDE: 600; 310; 30; 20 INJECTION, SOLUTION INTRAVENOUS at 11:13:00

## 2022-11-26 RX ADMIN — SODIUM CHLORIDE, SODIUM LACTATE, POTASSIUM CHLORIDE, CALCIUM CHLORIDE: 600; 310; 30; 20 INJECTION, SOLUTION INTRAVENOUS at 11:38:00

## 2022-11-26 RX ADMIN — CEFOXITIN 2 G: 2 INJECTION, POWDER, FOR SOLUTION INTRAVENOUS at 10:47:00

## 2022-11-26 RX ADMIN — SODIUM CHLORIDE, SODIUM LACTATE, POTASSIUM CHLORIDE, CALCIUM CHLORIDE: 600; 310; 30; 20 INJECTION, SOLUTION INTRAVENOUS at 10:25:00

## 2022-11-26 RX ADMIN — LIDOCAINE HYDROCHLORIDE 25 MG: 10 INJECTION, SOLUTION EPIDURAL; INFILTRATION; INTRACAUDAL; PERINEURAL at 10:38:00

## 2022-11-26 RX ADMIN — PHENYLEPHRINE HCL 100 MCG: 10 MG/ML VIAL (ML) INJECTION at 10:50:00

## 2022-11-26 RX ADMIN — ROCURONIUM BROMIDE 10 MG: 10 INJECTION, SOLUTION INTRAVENOUS at 10:50:00

## 2022-11-26 RX ADMIN — ROCURONIUM BROMIDE 30 MG: 10 INJECTION, SOLUTION INTRAVENOUS at 10:38:00

## 2022-11-26 RX ADMIN — KETOROLAC TROMETHAMINE 30 MG: 30 INJECTION, SOLUTION INTRAMUSCULAR; INTRAVENOUS at 11:11:00

## 2022-11-26 RX ADMIN — NEOSTIGMINE METHYLSULFATE 5 MG: 1 INJECTION, SOLUTION INTRAVENOUS at 11:24:00

## 2022-11-26 RX ADMIN — ONDANSETRON 4 MG: 2 INJECTION INTRAMUSCULAR; INTRAVENOUS at 11:15:00

## 2022-11-26 RX ADMIN — GLYCOPYRROLATE 0.9 MG: 0.2 INJECTION, SOLUTION INTRAMUSCULAR; INTRAVENOUS at 11:24:00

## 2022-11-26 RX ADMIN — DEXAMETHASONE SODIUM PHOSPHATE 4 MG: 4 MG/ML VIAL (ML) INJECTION at 10:48:00

## 2022-11-26 RX ADMIN — PHENYLEPHRINE HCL 100 MCG: 10 MG/ML VIAL (ML) INJECTION at 10:46:00

## 2022-11-26 RX ADMIN — MIDAZOLAM HYDROCHLORIDE 2 MG: 1 INJECTION INTRAMUSCULAR; INTRAVENOUS at 10:25:00

## 2022-11-26 NOTE — ANESTHESIA PROCEDURE NOTES
Airway  Date/Time: 11/26/2022 10:39 AM  Urgency: elective      General Information and Staff    Patient location during procedure: OR  Anesthesiologist: Claire Chaves MD  Performed: anesthesiologist     Indications and Patient Condition  Indications for airway management: anesthesia  Sedation level: deep  Preoxygenated: yes  Patient position: sniffing  MILS maintained throughout  Mask difficulty assessment: 0 - not attempted    Final Airway Details  Final airway type: endotracheal airway      Successful airway: ETT  Cuffed: yes   Successful intubation technique: direct laryngoscopy  Facilitating devices/methods: rapid sequence intubation and cricoid pressure  Endotracheal tube insertion site: oral  Blade: Clifford  Blade size: #4  ETT size (mm): 7.5    Cormack-Lehane Classification: grade IIA - partial view of glottis  Placement verified by: chest auscultation and capnometry   Cuff volume (mL): 5  Measured from: lips  ETT to lips (cm): 25  Number of attempts at approach: 1

## 2022-11-26 NOTE — PROGRESS NOTES
11/26/22 1317   Clinical Encounter Type   Visited With Patient not available  (Pt. had just returned from procedure and son will be here by 2:30 today and after then it would be better to discuss a POC.  JYOTI Calle said pt. is decisional.)   Continue Visiting Yes

## 2022-11-26 NOTE — PLAN OF CARE
Pt admitted to floor from ED with abd pain, cholecystitis. AOx4, saturating well on 2L O2, VSS. Pt does not use O2 at home, but he has pain while taking a deep breath so he is not able to take effective deep breaths. Pain controlled with PRN pain medications. NPO for cholecystectomy today. Surgeon was by to see pt early this morning, pt signed consult for surgery. Pt up with standby, voids, continent. Call light within reach, pt instructed not to get up on his own. All questions answered and needs met.

## 2022-11-26 NOTE — PROGRESS NOTES
General Surgery Progress    Received consult for abdominal pain and cholecystitis  Patient not seen and examined  Vitals stable  Labs reviewed, WBC 17, TB 0.4  Abdominal ultrasound shows distended gallbladder with 1.3cm stone in neck    For right now, will plan for possible cholecystectomy with cholangiogram tomorrow/later today  Keep NPO  IV fluids for hydration and resuscitation  Recommend 2L bolus with LR  IV antibiotics    Full consult note to follow    Kia Newell MD

## 2022-11-26 NOTE — OPERATIVE REPORT
[facility]  Op Note    Belkis Rogers Location: OR   CSN [id number] MRN [id number]   Admission Date 11/25/2022 Operation Date 11/26/2022   Attending Physician Lisa Rodriguez MD Operating Physician Rose Cintron MD   DATE OF OPERATION:  11/26/2022   PREOPERATIVE DIAGNOSIS: Acute cholecystitis with cholelithiasis  POSTOPERATIVE DIAGNOSIS: Acute cholecystitis with cholelithiasis  PROCEDURE PERFORMED: Laparoscopic cholecystectomy with intraoperative cholangiogram.   SURGEON:  Rose Cintron MD  ASSISTANT: Julieta Mead PA-C (Her assistance was essential to the performance and conduct of this case, especially in the performance of the cholangiogram and the careful dissection needed in and around the triangle of Calot and gallbladder hilum.)  ANESTHESIA: General.   SPECIMEN: Gallbladder to pathology. BLOOD LOSS:  5 cc   COMPLICATIONS: None. INDICATIONS FOR PROCEDURE: The patient is a 50-year-old gentleman who presented to the emergency room with acute right upper quadrant abdominal pain. Ultrasound revealed gallstones. He had an elevated white count. He was offered urgent laparoscopic cholecystectomy the intraoperative cholangiogram.  The risks, benefits, and alternatives were discussed in detail with the patient. Risks include but are not limited to bleeding, wound infection, right shoulder pain, injury to common bile duct, injury to the liver and injury to other intraabdominal contents. The patient was agreeable to proceed with the operation. FINDINGS: The gallbladder was distended with patchy necrosis. Cholangiography was normal.  OPERATIVE TECHNIQUE: After informed consent was obtained, the patient was taken to the operating room and placed in the supine position. General anesthesia was induced. The abdomen was then prepped and draped in the usual sterile fashion.  The umbilicus was inverted, and a curvilinear incision was made inferior to it with an 11-blade scalpel, through the prior scar.  The Veress needle was passed into the abdomen, and pneumoperitoneum was instituted without difficulty. The 11 mm trocar was passed through this incision site. The abdomen was inspected and found to be grossly normal. Under direct vision, a 5 mm subxiphoid port and two right-sided lateral 5 mm ports were placed. The patient was placed head-up, right side up. The gallbladder is distended and could not be grasped. The aspirator needle was advanced, and the contents of the gallbladder were aspirated. The gallbladder was then able to be grasped and retracted cranially. Dissection commenced around the cystic duct and cystic artery, which were isolated. Dissection was kept above the line of Rouviere. The critical view was obtained, demonstrating 2 structures directly entering the gallbladder. One clip was placed towards the gallbladder on the cystic duct, and the duct was partially transected. The cystic duct was cannulated. A cholangiogram was obtained showing good flow in the duodenum with no filling defects and good flow towards the liver. The cholangiocath was withdrawn, and three clips were placed opposite the partial transection. The duct was then completely transected. The cystic artery was isolated, one clip was placed towards the gallbladder, two away, and the artery was transected. The gallbladder was then dissected free from the liver bed using cautery. The gallbladder was then withdrawn through the umbilical port site after being placed in an Endo Catch bag. Inspection of the liver bed revealed some minor bleeding that was easily controlled with cautery. The abdomen was copiously irrigated with normal saline. Once hemostasis was assured and the irrigant returned as clear and colorless, the right-sided ports were withdrawn under direct vision. The pneumoperitoneum was aspirated, and the remaining ports were withdrawn.  The fascia at the umbilical port site was reapproximated with Maxon suture, and the skin at all four incisions was reapproximated with subcuticular Vicryl suture. 0.5% Marcaine with epinephrine was injected around the incisions to help with postoperative pain control. Steri-Strips and Mastisol were placed across the incisions. The patient tolerated the procedure well, was extubated in the OR, and went to the PACU in good condition.     Duong Rangel MD

## 2022-11-26 NOTE — PLAN OF CARE
Patient A&Ox4, VSS on room air. Did come back from PACU with 2L O2, PRN. POD #0 of lap morales, 4 lap sites to abdomen. Midline/umbilicus site with moderate amount of bleeding, reinforced dressing and monitoring. Patient denies pain. Tolerating clear liquid diet, will advance as tolerated. Spoke with patients Son, answered all questions. Plan to possibly DC home later today if pain controled and tolerating diet. Plan of care reviewed with patient, all questions answered, verbalized understanding.

## 2022-11-26 NOTE — INTERVAL H&P NOTE
Pre-op Diagnosis: Acute cholecystitis [K81.0]    The above referenced H&P was reviewed by Jerrell Pollock MD on 11/26/2022, the patient was examined and no significant changes have occurred in the patient's condition since the H&P was performed. I discussed with the patient and/or legal representative the potential benefits, risks and side effects of this procedure; the likelihood of the patient achieving goals; and potential problems that might occur during recuperation. I discussed reasonable alternatives to the procedure, including risks, benefits and side effects related to the alternatives and risks related to not receiving this procedure. We will proceed with procedure as planned.

## 2022-11-27 NOTE — PROGRESS NOTES
NURSING DISCHARGE NOTE    Discharged Home via Wheelchair. Accompanied by Family member  Belongings Returned to patient from safe. Reviewed DC paperwork with patient and son, reviewed medications, instructions, and restrictions. Work note and prescription given to patient.

## 2022-11-28 ENCOUNTER — PATIENT OUTREACH (OUTPATIENT)
Dept: CASE MANAGEMENT | Age: 62
End: 2022-11-28

## 2022-11-28 NOTE — PROGRESS NOTES
VM received; pt son, Zachery Mary requesting assistance w/scheduling apt (dc 11/26)    Dr Erma Santiago, [address] [facility] [address] [phone]  Follow up 10 days

## 2022-11-29 NOTE — PROGRESS NOTES
Returned [facility]   [facility]  [address] [place] [id number] [phone]  Apt made for Wed.  Dec. 7th @8:30am    Patients son Rocio Sahu notified

## 2022-12-07 ENCOUNTER — OFFICE VISIT (OUTPATIENT)
Facility: LOCATION | Age: 62
End: 2022-12-07

## 2022-12-07 VITALS — TEMPERATURE: 98 F | HEART RATE: 93 BPM

## 2022-12-07 DIAGNOSIS — Z90.49 HISTORY OF CHOLECYSTECTOMY: ICD-10-CM

## 2022-12-07 DIAGNOSIS — Z98.890 POSTOPERATIVE STATE: Primary | ICD-10-CM

## 2022-12-07 PROCEDURE — 99024 POSTOP FOLLOW-UP VISIT: CPT | Performed by: PHYSICIAN ASSISTANT

## 2023-02-02 ENCOUNTER — TELEPHONE (OUTPATIENT)
Dept: FAMILY MEDICINE CLINIC | Facility: CLINIC | Age: 63
End: 2023-02-02

## 2023-05-26 PROBLEM — Z86.010 HISTORY OF ADENOMATOUS POLYP OF COLON: Status: ACTIVE | Noted: 2023-05-26

## 2023-05-26 PROBLEM — Z86.0101 HISTORY OF ADENOMATOUS POLYP OF COLON: Status: ACTIVE | Noted: 2023-05-26

## 2023-07-10 ENCOUNTER — APPOINTMENT (OUTPATIENT)
Dept: ULTRASOUND IMAGING | Age: 63
End: 2023-07-10
Attending: NURSE PRACTITIONER
Payer: COMMERCIAL

## 2023-07-10 ENCOUNTER — HOSPITAL ENCOUNTER (OUTPATIENT)
Age: 63
Discharge: HOME | End: 2023-07-10
Payer: COMMERCIAL

## 2023-07-10 VITALS
BODY MASS INDEX: 26 KG/M2 | DIASTOLIC BLOOD PRESSURE: 85 MMHG | HEART RATE: 68 BPM | WEIGHT: 195 LBS | SYSTOLIC BLOOD PRESSURE: 137 MMHG | TEMPERATURE: 98 F | OXYGEN SATURATION: 99 % | RESPIRATION RATE: 18 BRPM

## 2023-07-10 DIAGNOSIS — N50.812 PAIN IN LEFT TESTICLE: Primary | ICD-10-CM

## 2023-07-10 DIAGNOSIS — N43.3 HYDROCELE, BILATERAL: ICD-10-CM

## 2023-07-10 LAB
#MXD IC: 0.6 X10ˆ3/UL (ref 0.1–1)
BUN BLD-MCNC: 16 MG/DL (ref 7–18)
CHLORIDE BLD-SCNC: 100 MMOL/L (ref 98–112)
CO2 BLD-SCNC: 27 MMOL/L (ref 21–32)
CREAT BLD-MCNC: 0.8 MG/DL
GFR SERPLBLD BASED ON 1.73 SQ M-ARVRAT: 99 ML/MIN/1.73M2 (ref 60–?)
GLUCOSE BLD-MCNC: 105 MG/DL (ref 70–99)
HCT VFR BLD AUTO: 48.5 %
HCT VFR BLD CALC: 49 %
HGB BLD-MCNC: 15.8 G/DL
ISTAT IONIZED CALCIUM FOR CHEM 8: 1.22 MMOL/L (ref 1.12–1.32)
LYMPHOCYTES # BLD AUTO: 1.9 X10ˆ3/UL (ref 1–4)
LYMPHOCYTES NFR BLD AUTO: 26.3 %
MCH RBC QN AUTO: 30.6 PG (ref 26–34)
MCHC RBC AUTO-ENTMCNC: 32.6 G/DL (ref 31–37)
MCV RBC AUTO: 94 FL (ref 80–100)
MIXED CELL %: 8.2 %
NEUTROPHILS # BLD AUTO: 4.6 X10ˆ3/UL (ref 1.5–7.7)
NEUTROPHILS NFR BLD AUTO: 65.5 %
PLATELET # BLD AUTO: 190 X10ˆ3/UL (ref 150–450)
POCT BILIRUBIN URINE: NEGATIVE
POCT GLUCOSE URINE: NEGATIVE MG/DL
POCT KETONE URINE: NEGATIVE MG/DL
POCT LEUKOCYTE ESTERASE URINE: NEGATIVE
POCT NITRITE URINE: NEGATIVE
POCT PH URINE: 6 (ref 5–8)
POCT PROTEIN URINE: NEGATIVE MG/DL
POCT SPECIFIC GRAVITY URINE: 1.02
POCT URINE CLARITY: CLEAR
POCT URINE COLOR: YELLOW
POCT UROBILINOGEN URINE: 0.2 MG/DL
POTASSIUM BLD-SCNC: 3.8 MMOL/L (ref 3.6–5.1)
RBC # BLD AUTO: 5.16 X10ˆ6/UL
SODIUM BLD-SCNC: 141 MMOL/L (ref 136–145)
WBC # BLD AUTO: 7.1 X10ˆ3/UL (ref 4–11)

## 2023-07-10 PROCEDURE — 99214 OFFICE O/P EST MOD 30 MIN: CPT

## 2023-07-10 PROCEDURE — 81002 URINALYSIS NONAUTO W/O SCOPE: CPT | Performed by: NURSE PRACTITIONER

## 2023-07-10 PROCEDURE — 80047 BASIC METABLC PNL IONIZED CA: CPT

## 2023-07-10 PROCEDURE — 93975 VASCULAR STUDY: CPT | Performed by: NURSE PRACTITIONER

## 2023-07-10 PROCEDURE — 36415 COLL VENOUS BLD VENIPUNCTURE: CPT

## 2023-07-10 PROCEDURE — 85025 COMPLETE CBC W/AUTO DIFF WBC: CPT | Performed by: NURSE PRACTITIONER

## 2023-07-10 PROCEDURE — 76870 US EXAM SCROTUM: CPT | Performed by: NURSE PRACTITIONER

## 2023-07-10 NOTE — ED INITIAL ASSESSMENT (HPI)
RLQ abdominal pain -  x 2 weeks . C/o discomfort on the RLQ in the morning, or when getting up. Last BM today. Denies fever, nausea or vomiting, urinary symptoms.

## 2023-07-10 NOTE — DISCHARGE INSTRUCTIONS
You may continue to take your baby aspirin as previously prescribed. It is okay to take over-the-counter acetaminophen as directed on packaging. Do not exceed 4000 mg of acetaminophen within a 24-hour period of time. Follow-up with urologist as directed.   Go to the emergency department if you are having increased swelling, pain of your testicle, abdominal pain or if you are running fevers

## 2023-07-11 ENCOUNTER — OFFICE VISIT (OUTPATIENT)
Dept: SURGERY | Facility: CLINIC | Age: 63
End: 2023-07-11

## 2023-07-11 DIAGNOSIS — N45.1 EPIDIDYMITIS, LEFT: ICD-10-CM

## 2023-07-11 DIAGNOSIS — N40.1 BPH WITH OBSTRUCTION/LOWER URINARY TRACT SYMPTOMS: ICD-10-CM

## 2023-07-11 DIAGNOSIS — R31.21 ASYMPTOMATIC MICROSCOPIC HEMATURIA: Primary | ICD-10-CM

## 2023-07-11 DIAGNOSIS — N13.8 BPH WITH OBSTRUCTION/LOWER URINARY TRACT SYMPTOMS: ICD-10-CM

## 2023-07-11 DIAGNOSIS — N52.9 ERECTILE DYSFUNCTION, UNSPECIFIED ERECTILE DYSFUNCTION TYPE: ICD-10-CM

## 2023-07-11 PROCEDURE — 99204 OFFICE O/P NEW MOD 45 MIN: CPT | Performed by: UROLOGY

## 2023-07-11 RX ORDER — SILDENAFIL 100 MG/1
100 TABLET, FILM COATED ORAL
Qty: 30 TABLET | Refills: 5 | Status: SHIPPED | OUTPATIENT
Start: 2023-07-11 | End: 2023-07-11

## 2023-07-11 RX ORDER — SILDENAFIL 100 MG/1
100 TABLET, FILM COATED ORAL
Qty: 30 TABLET | Refills: 5 | Status: SHIPPED | OUTPATIENT
Start: 2023-07-11

## 2023-07-11 RX ORDER — CIPROFLOXACIN 500 MG/1
500 TABLET, FILM COATED ORAL 2 TIMES DAILY
Qty: 28 TABLET | Refills: 0 | Status: SHIPPED | OUTPATIENT
Start: 2023-07-11 | End: 2023-07-25

## 2023-07-11 NOTE — H&P
HPI:     Obdulia Mcdonald is a 60year old male with a PMH of CVD, HL, hemorrhoids. He presents as a consult with:  1. review scrotal US results  2. Abdominal pain  3. Requests viagra Rx    PCP - Peyton Jasso to ER ytd with SP and RLQ abd pain x 2 weeks up to 2/10 but currently 0/10. Worse when 1st getting up in AM. No other exacerbating/alleviating factors. Also with left testis pain which has since resolved. Had scrotal US ytd which was essentially NL but patient told to f/u with urology. We added him on to be seen today. AUA SS is zero. Happy with LUTS and declines meds. Incontinence: none  Penoscrotal: uncircumcised, no abnormalities or tenderness  CHRIS: ~ 40 g, no nodules or tenderness    UA was negative in ER    UTI hx: none  Gross hematuria: none  Tobacco hx: none  Kidney stone hx: none  Fam h/o  malignancy: none    PSA 1.43 10/4/22  Abd US 11/25/22: distended GB with gallstones, renal cysts but otherwise NL. Scrotal US 7/10/23: small b/l hydroceles, possible small left varicocele    Has taken viagra in the past which works well. Wants it sent to the [facility]. Told him I don't know what pharmacy that would be. Said it's OK to send to [facility] instead. Discussed no intervention is required for essentially NL scrotal US findings. Given the persistent pain I would suggest CT SP for further evaluation. If pain recurs in the testicle he can start 2 weeks abx. If CT is negative I would recommend he f/u with PCP regarding abdominal pain. Plan for observation for BPH/LUTS, CT SP for abdominal pain. Pt will start 2 weeks cipro if testis pain returns. 100 mg viagra Rx with [facility].     HISTORY:  Past Medical History:   Diagnosis Date    Acute, but ill-defined, cerebrovascular disease 2014    Blood in the stool 10/19/2022    Hemorrhoids 10/05/2022    High cholesterol 10/05/2022    Other and unspecified hyperlipidemia     Vitamin D deficiency     Wears glasses 2012      Past Surgical History:   Procedure Laterality Date    ANESTH,KNEE AREA SURGERY      torn ACL left    APPENDECTOMY  03/2009    APPENDECTOMY      HEMORRHOIDECTOMY,INT/EXT,SIMPLE  2018    OTHER SURGICAL HISTORY      left knee surgery    OTHER SURGICAL HISTORY      lipomas    REMOVAL GALLBLADDER      SURGERY - INTERNAL Left     testicle during childhood      Family History   Problem Relation Age of Onset    Other (grave's disease) Son     Other (Other) Mother         OA    Other (CVA) Mother     Stroke Mother     Cancer Father         prostate cancer      Social History:   Social History     Socioeconomic History    Marital status:    Tobacco Use    Smoking status: Never    Smokeless tobacco: Never   Vaping Use    Vaping Use: Never used   Substance and Sexual Activity    Alcohol use: Yes     Alcohol/week: 3.0 standard drinks of alcohol     Types: 1 Glasses of wine, 1 Cans of beer, 1 Shots of liquor per week     Comment: wine on occasion    Drug use: Never   Other Topics Concern    Caffeine Concern Yes     Comment: 2-3 cups per day coffee    Exercise Yes     Comment: treadmill at home 30 mins 2-3x/wk    Seat Belt Yes        Medications (Active prior to today's visit):  Current Outpatient Medications   Medication Sig Dispense Refill    ciprofloxacin 500 MG Oral Tab Take 1 tablet (500 mg total) by mouth 2 (two) times daily for 14 days. 28 tablet 0    Sildenafil Citrate 100 MG Oral Tab Take 1 tablet (100 mg total) by mouth daily as needed for Erectile Dysfunction. Take ~ one hour prior to sexual activity on an empty stomach 30 tablet 5    PEG 3350-KCl-Na Bicarb-NaCl 420 g Oral Recon Soln Take 4,000 mL by mouth As Directed. 4000 mL 0    aspirin 81 MG Oral Tab Take 1 tablet (81 mg total) by mouth daily.  0    HYDROcodone-acetaminophen (NORCO) 5-325 MG Oral Tab Take 1 tablet by mouth every 6 (six) hours as needed for Pain. 20 tablet 0    atorvastatin 10 MG Oral Tab Take 1 tablet (10 mg total) by mouth nightly.  30 tablet 3 Allergies:  No Known Allergies      ROS:     A comprehensive 10 point review of systems was completed. Pertinent positives and negatives noted in the the HPI. PHYSICAL EXAM:     GENERAL APPEARANCE: well, developed, well nourished, in no acute distress  NEUROLOGIC: nonfocal, alert and oriented  HEAD: normocephalic, atraumatic  EYES: sclera non-icteric  EARS: hearing intact  ORAL CAVITY: mucosa moist  NECK/THYROID: no obvious goiter or masses  LUNGS: nonlabored breathing  ABDOMEN: soft, no obvious masses or tenderness  SKIN: no obvious rashes    : as noted above    ASSESSMENT/PLAN:   Diagnoses and all orders for this visit:    Asymptomatic microscopic hematuria  -     CT ABDOMEN+PELVIS KIDNEYSTONE 2D RNDR(NO IV,NO ORAL)(CPT=[id number]); Future    Epididymitis, left  -     ciprofloxacin 500 MG Oral Tab; Take 1 tablet (500 mg total) by mouth 2 (two) times daily for 14 days. BPH with obstruction/lower urinary tract symptoms    Erectile dysfunction, unspecified erectile dysfunction type  -     Sildenafil Citrate 100 MG Oral Tab; Take 1 tablet (100 mg total) by mouth daily as needed for Erectile Dysfunction. Take ~ one hour prior to sexual activity on an empty stomach    Other orders  -     Discontinue: Sildenafil Citrate 100 MG Oral Tab; Take 1 tablet (100 mg total) by mouth daily as needed for Erectile Dysfunction. Take ~ one hour prior to sexual activity on an empty stomach      - as noted above. Thanks again for this consult.     Porsche Hensley MD, [address]  Urologist  [address]  Office: [phone]

## 2023-07-14 ENCOUNTER — HOSPITAL ENCOUNTER (OUTPATIENT)
Dept: CT IMAGING | Age: 63
Discharge: HOME | End: 2023-07-14
Attending: UROLOGY
Payer: COMMERCIAL

## 2023-07-14 DIAGNOSIS — R31.21 ASYMPTOMATIC MICROSCOPIC HEMATURIA: ICD-10-CM

## 2023-07-14 PROCEDURE — 74176 CT ABD & PELVIS W/O CONTRAST: CPT | Performed by: UROLOGY

## 2023-09-11 ENCOUNTER — LAB ENCOUNTER (OUTPATIENT)
Dept: LAB | Age: 63
End: 2023-09-11
Attending: FAMILY MEDICINE
Payer: COMMERCIAL

## 2023-09-11 ENCOUNTER — OFFICE VISIT (OUTPATIENT)
Dept: FAMILY MEDICINE CLINIC | Facility: CLINIC | Age: 63
End: 2023-09-11
Payer: COMMERCIAL

## 2023-09-11 VITALS
BODY MASS INDEX: 26.01 KG/M2 | DIASTOLIC BLOOD PRESSURE: 70 MMHG | WEIGHT: 192 LBS | TEMPERATURE: 98 F | HEIGHT: 72 IN | HEART RATE: 68 BPM | SYSTOLIC BLOOD PRESSURE: 118 MMHG | RESPIRATION RATE: 14 BRPM

## 2023-09-11 DIAGNOSIS — Z00.00 PHYSICAL EXAM, ANNUAL: Primary | ICD-10-CM

## 2023-09-11 DIAGNOSIS — R10.30 ABDOMINAL PAIN, LOWER: ICD-10-CM

## 2023-09-11 DIAGNOSIS — Z12.5 SCREENING FOR PROSTATE CANCER: ICD-10-CM

## 2023-09-11 DIAGNOSIS — Z00.00 PHYSICAL EXAM, ANNUAL: ICD-10-CM

## 2023-09-11 DIAGNOSIS — E55.9 VITAMIN D DEFICIENCY: ICD-10-CM

## 2023-09-11 DIAGNOSIS — Z13.89 SCREENING FOR GENITOURINARY CONDITION: ICD-10-CM

## 2023-09-11 DIAGNOSIS — Z00.00 LABORATORY EXAMINATION ORDERED AS PART OF A ROUTINE GENERAL MEDICAL EXAMINATION: ICD-10-CM

## 2023-09-11 DIAGNOSIS — E04.1 THYROID NODULE: ICD-10-CM

## 2023-09-11 LAB
ALBUMIN SERPL-MCNC: 3.9 G/DL (ref 3.4–5)
ALBUMIN/GLOB SERPL: 1.1 {RATIO} (ref 1–2)
ALP LIVER SERPL-CCNC: 63 U/L
ALT SERPL-CCNC: 56 U/L
ANION GAP SERPL CALC-SCNC: 5 MMOL/L (ref 0–18)
AST SERPL-CCNC: 33 U/L (ref 15–37)
BASOPHILS # BLD AUTO: 0.05 X10(3) UL (ref 0–0.2)
BASOPHILS NFR BLD AUTO: 0.7 %
BILIRUB SERPL-MCNC: 0.4 MG/DL (ref 0.1–2)
BILIRUB UR QL STRIP.AUTO: NEGATIVE
BUN BLD-MCNC: 14 MG/DL (ref 7–18)
CALCIUM BLD-MCNC: 9.2 MG/DL (ref 8.5–10.1)
CHLORIDE SERPL-SCNC: 106 MMOL/L (ref 98–112)
CHOLEST SERPL-MCNC: 211 MG/DL (ref ?–200)
CLARITY UR REFRACT.AUTO: CLEAR
CO2 SERPL-SCNC: 29 MMOL/L (ref 21–32)
COMPLEXED PSA SERPL-MCNC: 1.38 NG/ML (ref ?–4)
CREAT BLD-MCNC: 0.84 MG/DL
EGFRCR SERPLBLD CKD-EPI 2021: 98 ML/MIN/1.73M2 (ref 60–?)
EOSINOPHIL # BLD AUTO: 0.19 X10(3) UL (ref 0–0.7)
EOSINOPHIL NFR BLD AUTO: 2.8 %
ERYTHROCYTE [DISTWIDTH] IN BLOOD BY AUTOMATED COUNT: 12.1 %
FASTING PATIENT LIPID ANSWER: NO
FASTING STATUS PATIENT QL REPORTED: NO
GLOBULIN PLAS-MCNC: 3.6 G/DL (ref 2.8–4.4)
GLUCOSE BLD-MCNC: 98 MG/DL (ref 70–99)
GLUCOSE UR STRIP.AUTO-MCNC: NORMAL MG/DL
HCT VFR BLD AUTO: 46.9 %
HDLC SERPL-MCNC: 54 MG/DL (ref 40–59)
HGB BLD-MCNC: 15.5 G/DL
IMM GRANULOCYTES # BLD AUTO: 0.02 X10(3) UL (ref 0–1)
IMM GRANULOCYTES NFR BLD: 0.3 %
KETONES UR STRIP.AUTO-MCNC: NEGATIVE MG/DL
LDLC SERPL CALC-MCNC: 133 MG/DL (ref ?–100)
LEUKOCYTE ESTERASE UR QL STRIP.AUTO: NEGATIVE
LYMPHOCYTES # BLD AUTO: 1.67 X10(3) UL (ref 1–4)
LYMPHOCYTES NFR BLD AUTO: 24.3 %
MCH RBC QN AUTO: 30.8 PG (ref 26–34)
MCHC RBC AUTO-ENTMCNC: 33 G/DL (ref 31–37)
MCV RBC AUTO: 93.1 FL
MONOCYTES # BLD AUTO: 0.56 X10(3) UL (ref 0.1–1)
MONOCYTES NFR BLD AUTO: 8.1 %
NEUTROPHILS # BLD AUTO: 4.39 X10 (3) UL (ref 1.5–7.7)
NEUTROPHILS # BLD AUTO: 4.39 X10(3) UL (ref 1.5–7.7)
NEUTROPHILS NFR BLD AUTO: 63.8 %
NITRITE UR QL STRIP.AUTO: NEGATIVE
NONHDLC SERPL-MCNC: 157 MG/DL (ref ?–130)
OSMOLALITY SERPL CALC.SUM OF ELEC: 290 MOSM/KG (ref 275–295)
PH UR STRIP.AUTO: 5 [PH] (ref 5–8)
PLATELET # BLD AUTO: 206 10(3)UL (ref 150–450)
POTASSIUM SERPL-SCNC: 3.7 MMOL/L (ref 3.5–5.1)
PROT SERPL-MCNC: 7.5 G/DL (ref 6.4–8.2)
PROT UR STRIP.AUTO-MCNC: NEGATIVE MG/DL
RBC # BLD AUTO: 5.04 X10(6)UL
RBC UR QL AUTO: NEGATIVE
SODIUM SERPL-SCNC: 140 MMOL/L (ref 136–145)
SP GR UR STRIP.AUTO: 1.02 (ref 1–1.03)
TRIGL SERPL-MCNC: 136 MG/DL (ref 30–149)
TSI SER-ACNC: 1.58 MIU/ML (ref 0.36–3.74)
UROBILINOGEN UR STRIP.AUTO-MCNC: NORMAL MG/DL
VIT D+METAB SERPL-MCNC: 24.3 NG/ML (ref 30–100)
VLDLC SERPL CALC-MCNC: 25 MG/DL (ref 0–30)
WBC # BLD AUTO: 6.9 X10(3) UL (ref 4–11)

## 2023-09-11 PROCEDURE — 82306 VITAMIN D 25 HYDROXY: CPT

## 2023-09-11 PROCEDURE — 85025 COMPLETE CBC W/AUTO DIFF WBC: CPT

## 2023-09-11 PROCEDURE — 99214 OFFICE O/P EST MOD 30 MIN: CPT | Performed by: FAMILY MEDICINE

## 2023-09-11 PROCEDURE — 80053 COMPREHEN METABOLIC PANEL: CPT

## 2023-09-11 PROCEDURE — 81003 URINALYSIS AUTO W/O SCOPE: CPT

## 2023-09-11 PROCEDURE — 90471 IMMUNIZATION ADMIN: CPT | Performed by: FAMILY MEDICINE

## 2023-09-11 PROCEDURE — 3008F BODY MASS INDEX DOCD: CPT | Performed by: FAMILY MEDICINE

## 2023-09-11 PROCEDURE — 3074F SYST BP LT 130 MM HG: CPT | Performed by: FAMILY MEDICINE

## 2023-09-11 PROCEDURE — 90715 TDAP VACCINE 7 YRS/> IM: CPT | Performed by: FAMILY MEDICINE

## 2023-09-11 PROCEDURE — 80061 LIPID PANEL: CPT

## 2023-09-11 PROCEDURE — 84443 ASSAY THYROID STIM HORMONE: CPT

## 2023-09-11 PROCEDURE — 99396 PREV VISIT EST AGE 40-64: CPT | Performed by: FAMILY MEDICINE

## 2023-09-11 PROCEDURE — 3078F DIAST BP <80 MM HG: CPT | Performed by: FAMILY MEDICINE

## 2023-09-11 NOTE — PATIENT INSTRUCTIONS
Call [phone] to schedule ultrasound of the thyroid. Healthy diet. Stay active. Call [phone] to schedule ultrasound of the groin. See Dr. Joon Reddy surgeon for evaluation. Do fasting blood work today.

## 2023-09-12 DIAGNOSIS — E78.00 HYPERCHOLESTEREMIA: ICD-10-CM

## 2023-09-12 DIAGNOSIS — E55.9 VITAMIN D DEFICIENCY: Primary | ICD-10-CM

## 2023-09-12 RX ORDER — ERGOCALCIFEROL 1.25 MG/1
50000 CAPSULE ORAL WEEKLY
Qty: 12 CAPSULE | Refills: 0 | Status: SHIPPED | OUTPATIENT
Start: 2023-09-12

## 2023-09-13 ENCOUNTER — TELEPHONE (OUTPATIENT)
Dept: FAMILY MEDICINE CLINIC | Facility: CLINIC | Age: 63
End: 2023-09-13

## 2023-09-13 DIAGNOSIS — R10.30 ABDOMINAL PAIN, LOWER: Primary | ICD-10-CM

## 2023-09-18 ENCOUNTER — HOSPITAL ENCOUNTER (OUTPATIENT)
Dept: ULTRASOUND IMAGING | Facility: HOSPITAL | Age: 63
Discharge: HOME | End: 2023-09-18
Attending: FAMILY MEDICINE
Payer: COMMERCIAL

## 2023-09-18 DIAGNOSIS — R10.30 ABDOMINAL PAIN, LOWER: ICD-10-CM

## 2023-09-18 PROCEDURE — 76882 US LMTD JT/FCL EVL NVASC XTR: CPT | Performed by: FAMILY MEDICINE

## 2023-09-20 ENCOUNTER — OFFICE VISIT (OUTPATIENT)
Dept: SURGERY | Facility: CLINIC | Age: 63
End: 2023-09-20
Payer: COMMERCIAL

## 2023-09-20 VITALS — TEMPERATURE: 99 F | HEART RATE: 65 BPM

## 2023-09-20 DIAGNOSIS — K40.90 RIGHT INGUINAL HERNIA: Primary | ICD-10-CM

## 2023-09-20 PROBLEM — K81.0 ACUTE CHOLECYSTITIS: Status: RESOLVED | Noted: 2022-11-26 | Resolved: 2023-09-20

## 2023-09-20 PROCEDURE — 99214 OFFICE O/P EST MOD 30 MIN: CPT | Performed by: SURGERY

## 2023-09-21 ENCOUNTER — TELEPHONE (OUTPATIENT)
Facility: LOCATION | Age: 63
End: 2023-09-21

## 2023-09-21 NOTE — TELEPHONE ENCOUNTER
Pt needs [facility]     Pt called because he is wondering if he can have his surgery be on a Thursday instead of Monday 10/16.      Please advise  Best callback number is [facility] ID- [id number]

## 2023-09-24 ENCOUNTER — HOSPITAL ENCOUNTER (OUTPATIENT)
Dept: ULTRASOUND IMAGING | Age: 63
Discharge: HOME | End: 2023-09-24
Attending: FAMILY MEDICINE
Payer: COMMERCIAL

## 2023-09-24 ENCOUNTER — HOSPITAL ENCOUNTER (OUTPATIENT)
Dept: ULTRASOUND IMAGING | Age: 63
End: 2023-09-24
Attending: FAMILY MEDICINE
Payer: COMMERCIAL

## 2023-09-24 DIAGNOSIS — E04.1 THYROID NODULE: ICD-10-CM

## 2023-09-24 PROCEDURE — 76536 US EXAM OF HEAD AND NECK: CPT | Performed by: FAMILY MEDICINE

## 2023-10-26 ENCOUNTER — HOSPITAL ENCOUNTER (OUTPATIENT)
Facility: HOSPITAL | Age: 63
Discharge: HOME | End: 2023-10-27
Attending: SURGERY | Admitting: SURGERY

## 2023-10-26 ENCOUNTER — ANESTHESIA (OUTPATIENT)
Dept: SURGERY | Facility: HOSPITAL | Age: 63
End: 2023-10-26

## 2023-10-26 ENCOUNTER — ANESTHESIA EVENT (OUTPATIENT)
Dept: SURGERY | Facility: HOSPITAL | Age: 63
End: 2023-10-26

## 2023-10-26 DIAGNOSIS — K40.90 RIGHT INGUINAL HERNIA: ICD-10-CM

## 2023-10-26 PROCEDURE — 0JB80ZZ EXCISION OF ABDOMEN SUBCUTANEOUS TISSUE AND FASCIA, OPEN APPROACH: ICD-10-PCS | Performed by: SURGERY

## 2023-10-26 PROCEDURE — 88304 TISSUE EXAM BY PATHOLOGIST: CPT | Performed by: SURGERY

## 2023-10-26 PROCEDURE — 0YU54JZ SUPPLEMENT RIGHT INGUINAL REGION WITH SYNTHETIC SUBSTITUTE, PERCUTANEOUS ENDOSCOPIC APPROACH: ICD-10-PCS | Performed by: SURGERY

## 2023-10-26 DEVICE — BARD MESH
Type: IMPLANTABLE DEVICE | Site: INGUINAL | Status: FUNCTIONAL
Brand: BARD MESH

## 2023-10-26 RX ORDER — PROCHLORPERAZINE EDISYLATE 5 MG/ML
5 INJECTION INTRAMUSCULAR; INTRAVENOUS EVERY 8 HOURS PRN
Status: DISCONTINUED | OUTPATIENT
Start: 2023-10-26 | End: 2023-10-26 | Stop reason: HOSPADM

## 2023-10-26 RX ORDER — SODIUM CHLORIDE, SODIUM LACTATE, POTASSIUM CHLORIDE, CALCIUM CHLORIDE 600; 310; 30; 20 MG/100ML; MG/100ML; MG/100ML; MG/100ML
INJECTION, SOLUTION INTRAVENOUS CONTINUOUS
Status: DISCONTINUED | OUTPATIENT
Start: 2023-10-26 | End: 2023-10-27

## 2023-10-26 RX ORDER — SCOLOPAMINE TRANSDERMAL SYSTEM 1 MG/1
1 PATCH, EXTENDED RELEASE TRANSDERMAL ONCE
Status: DISCONTINUED | OUTPATIENT
Start: 2023-10-26 | End: 2023-10-26 | Stop reason: HOSPADM

## 2023-10-26 RX ORDER — KETOROLAC TROMETHAMINE 30 MG/ML
INJECTION, SOLUTION INTRAMUSCULAR; INTRAVENOUS AS NEEDED
Status: DISCONTINUED | OUTPATIENT
Start: 2023-10-26 | End: 2023-10-26 | Stop reason: SURG

## 2023-10-26 RX ORDER — HYDROMORPHONE HYDROCHLORIDE 1 MG/ML
0.4 INJECTION, SOLUTION INTRAMUSCULAR; INTRAVENOUS; SUBCUTANEOUS EVERY 5 MIN PRN
Status: DISCONTINUED | OUTPATIENT
Start: 2023-10-26 | End: 2023-10-26 | Stop reason: HOSPADM

## 2023-10-26 RX ORDER — ONDANSETRON 2 MG/ML
4 INJECTION INTRAMUSCULAR; INTRAVENOUS EVERY 6 HOURS PRN
Status: DISCONTINUED | OUTPATIENT
Start: 2023-10-26 | End: 2023-10-27

## 2023-10-26 RX ORDER — HYDROCODONE BITARTRATE AND ACETAMINOPHEN 5; 325 MG/1; MG/1
2 TABLET ORAL ONCE AS NEEDED
Status: COMPLETED | OUTPATIENT
Start: 2023-10-26 | End: 2023-10-26

## 2023-10-26 RX ORDER — HYDROMORPHONE HYDROCHLORIDE 1 MG/ML
0.4 INJECTION, SOLUTION INTRAMUSCULAR; INTRAVENOUS; SUBCUTANEOUS EVERY 2 HOUR PRN
Status: DISCONTINUED | OUTPATIENT
Start: 2023-10-26 | End: 2023-10-27

## 2023-10-26 RX ORDER — MEPERIDINE HYDROCHLORIDE 25 MG/ML
12.5 INJECTION INTRAMUSCULAR; INTRAVENOUS; SUBCUTANEOUS AS NEEDED
Status: DISCONTINUED | OUTPATIENT
Start: 2023-10-26 | End: 2023-10-26 | Stop reason: HOSPADM

## 2023-10-26 RX ORDER — OXYCODONE HYDROCHLORIDE 10 MG/1
10 TABLET ORAL EVERY 4 HOURS PRN
Status: DISCONTINUED | OUTPATIENT
Start: 2023-10-26 | End: 2023-10-27

## 2023-10-26 RX ORDER — ONDANSETRON 2 MG/ML
INJECTION INTRAMUSCULAR; INTRAVENOUS AS NEEDED
Status: DISCONTINUED | OUTPATIENT
Start: 2023-10-26 | End: 2023-10-26 | Stop reason: SURG

## 2023-10-26 RX ORDER — FAMOTIDINE 20 MG/1
20 TABLET, FILM COATED ORAL NIGHTLY
Status: DISCONTINUED | OUTPATIENT
Start: 2023-10-26 | End: 2023-10-27

## 2023-10-26 RX ORDER — CEFAZOLIN SODIUM/WATER 2 G/20 ML
2 SYRINGE (ML) INTRAVENOUS ONCE
Status: COMPLETED | OUTPATIENT
Start: 2023-10-26 | End: 2023-10-26

## 2023-10-26 RX ORDER — ACETAMINOPHEN 500 MG
1000 TABLET ORAL ONCE AS NEEDED
Status: COMPLETED | OUTPATIENT
Start: 2023-10-26 | End: 2023-10-26

## 2023-10-26 RX ORDER — DEXAMETHASONE SODIUM PHOSPHATE 4 MG/ML
VIAL (ML) INJECTION AS NEEDED
Status: DISCONTINUED | OUTPATIENT
Start: 2023-10-26 | End: 2023-10-26 | Stop reason: SURG

## 2023-10-26 RX ORDER — HYDROMORPHONE HYDROCHLORIDE 1 MG/ML
0.2 INJECTION, SOLUTION INTRAMUSCULAR; INTRAVENOUS; SUBCUTANEOUS EVERY 5 MIN PRN
Status: DISCONTINUED | OUTPATIENT
Start: 2023-10-26 | End: 2023-10-26 | Stop reason: HOSPADM

## 2023-10-26 RX ORDER — HEPARIN SODIUM 5000 [USP'U]/ML
5000 INJECTION, SOLUTION INTRAVENOUS; SUBCUTANEOUS ONCE
Status: COMPLETED | OUTPATIENT
Start: 2023-10-26 | End: 2023-10-26

## 2023-10-26 RX ORDER — HYDROCODONE BITARTRATE AND ACETAMINOPHEN 5; 325 MG/1; MG/1
1 TABLET ORAL ONCE AS NEEDED
Status: COMPLETED | OUTPATIENT
Start: 2023-10-26 | End: 2023-10-26

## 2023-10-26 RX ORDER — HYDROMORPHONE HYDROCHLORIDE 1 MG/ML
INJECTION, SOLUTION INTRAMUSCULAR; INTRAVENOUS; SUBCUTANEOUS
Status: COMPLETED
Start: 2023-10-26 | End: 2023-10-26

## 2023-10-26 RX ORDER — CEFAZOLIN SODIUM/WATER 2 G/20 ML
SYRINGE (ML) INTRAVENOUS
Status: DISPENSED
Start: 2023-10-26 | End: 2023-10-26

## 2023-10-26 RX ORDER — HYDROCODONE BITARTRATE AND ACETAMINOPHEN 5; 325 MG/1; MG/1
TABLET ORAL
Status: COMPLETED
Start: 2023-10-26 | End: 2023-10-26

## 2023-10-26 RX ORDER — ONDANSETRON 2 MG/ML
4 INJECTION INTRAMUSCULAR; INTRAVENOUS EVERY 6 HOURS PRN
Status: DISCONTINUED | OUTPATIENT
Start: 2023-10-26 | End: 2023-10-26 | Stop reason: HOSPADM

## 2023-10-26 RX ORDER — NALOXONE HYDROCHLORIDE 0.4 MG/ML
80 INJECTION, SOLUTION INTRAMUSCULAR; INTRAVENOUS; SUBCUTANEOUS AS NEEDED
Status: DISCONTINUED | OUTPATIENT
Start: 2023-10-26 | End: 2023-10-26 | Stop reason: HOSPADM

## 2023-10-26 RX ORDER — ROCURONIUM BROMIDE 10 MG/ML
INJECTION, SOLUTION INTRAVENOUS AS NEEDED
Status: DISCONTINUED | OUTPATIENT
Start: 2023-10-26 | End: 2023-10-26 | Stop reason: SURG

## 2023-10-26 RX ORDER — LIDOCAINE HYDROCHLORIDE 10 MG/ML
INJECTION, SOLUTION EPIDURAL; INFILTRATION; INTRACAUDAL; PERINEURAL AS NEEDED
Status: DISCONTINUED | OUTPATIENT
Start: 2023-10-26 | End: 2023-10-26 | Stop reason: SURG

## 2023-10-26 RX ORDER — METOCLOPRAMIDE HYDROCHLORIDE 5 MG/ML
INJECTION INTRAMUSCULAR; INTRAVENOUS AS NEEDED
Status: DISCONTINUED | OUTPATIENT
Start: 2023-10-26 | End: 2023-10-26 | Stop reason: SURG

## 2023-10-26 RX ORDER — FAMOTIDINE 10 MG/ML
20 INJECTION, SOLUTION INTRAVENOUS NIGHTLY
Status: DISCONTINUED | OUTPATIENT
Start: 2023-10-26 | End: 2023-10-27

## 2023-10-26 RX ORDER — HYDROMORPHONE HYDROCHLORIDE 1 MG/ML
0.8 INJECTION, SOLUTION INTRAMUSCULAR; INTRAVENOUS; SUBCUTANEOUS EVERY 2 HOUR PRN
Status: DISCONTINUED | OUTPATIENT
Start: 2023-10-26 | End: 2023-10-27

## 2023-10-26 RX ORDER — OXYCODONE HYDROCHLORIDE 10 MG/1
5 TABLET ORAL EVERY 4 HOURS PRN
Status: DISCONTINUED | OUTPATIENT
Start: 2023-10-26 | End: 2023-10-27

## 2023-10-26 RX ORDER — PROCHLORPERAZINE EDISYLATE 5 MG/ML
5 INJECTION INTRAMUSCULAR; INTRAVENOUS EVERY 8 HOURS PRN
Status: DISCONTINUED | OUTPATIENT
Start: 2023-10-26 | End: 2023-10-27

## 2023-10-26 RX ORDER — SODIUM CHLORIDE, SODIUM LACTATE, POTASSIUM CHLORIDE, CALCIUM CHLORIDE 600; 310; 30; 20 MG/100ML; MG/100ML; MG/100ML; MG/100ML
INJECTION, SOLUTION INTRAVENOUS CONTINUOUS
Status: DISCONTINUED | OUTPATIENT
Start: 2023-10-26 | End: 2023-10-26

## 2023-10-26 RX ORDER — MIDAZOLAM HYDROCHLORIDE 1 MG/ML
1 INJECTION INTRAMUSCULAR; INTRAVENOUS EVERY 5 MIN PRN
Status: DISCONTINUED | OUTPATIENT
Start: 2023-10-26 | End: 2023-10-26 | Stop reason: HOSPADM

## 2023-10-26 RX ORDER — BUPIVACAINE HYDROCHLORIDE AND EPINEPHRINE 5; 5 MG/ML; UG/ML
INJECTION, SOLUTION EPIDURAL; INTRACAUDAL; PERINEURAL AS NEEDED
Status: DISCONTINUED | OUTPATIENT
Start: 2023-10-26 | End: 2023-10-26 | Stop reason: HOSPADM

## 2023-10-26 RX ORDER — MIDAZOLAM HYDROCHLORIDE 1 MG/ML
INJECTION INTRAMUSCULAR; INTRAVENOUS AS NEEDED
Status: DISCONTINUED | OUTPATIENT
Start: 2023-10-26 | End: 2023-10-26 | Stop reason: SURG

## 2023-10-26 RX ORDER — HEPARIN SODIUM 5000 [USP'U]/ML
INJECTION, SOLUTION INTRAVENOUS; SUBCUTANEOUS
Status: DISPENSED
Start: 2023-10-26 | End: 2023-10-26

## 2023-10-26 RX ORDER — ACETAMINOPHEN 500 MG
1000 TABLET ORAL ONCE
Status: DISCONTINUED | OUTPATIENT
Start: 2023-10-26 | End: 2023-10-26 | Stop reason: HOSPADM

## 2023-10-26 RX ORDER — GLYCOPYRROLATE 0.2 MG/ML
INJECTION, SOLUTION INTRAMUSCULAR; INTRAVENOUS AS NEEDED
Status: DISCONTINUED | OUTPATIENT
Start: 2023-10-26 | End: 2023-10-26 | Stop reason: SURG

## 2023-10-26 RX ORDER — HYDROMORPHONE HYDROCHLORIDE 1 MG/ML
0.6 INJECTION, SOLUTION INTRAMUSCULAR; INTRAVENOUS; SUBCUTANEOUS EVERY 5 MIN PRN
Status: DISCONTINUED | OUTPATIENT
Start: 2023-10-26 | End: 2023-10-26 | Stop reason: HOSPADM

## 2023-10-26 RX ORDER — NEOSTIGMINE METHYLSULFATE 1 MG/ML
INJECTION, SOLUTION INTRAVENOUS AS NEEDED
Status: DISCONTINUED | OUTPATIENT
Start: 2023-10-26 | End: 2023-10-26 | Stop reason: SURG

## 2023-10-26 RX ORDER — LABETALOL HYDROCHLORIDE 5 MG/ML
5 INJECTION, SOLUTION INTRAVENOUS EVERY 5 MIN PRN
Status: DISCONTINUED | OUTPATIENT
Start: 2023-10-26 | End: 2023-10-26 | Stop reason: HOSPADM

## 2023-10-26 RX ORDER — HYDROCODONE BITARTRATE AND ACETAMINOPHEN 5; 325 MG/1; MG/1
1 TABLET ORAL EVERY 6 HOURS PRN
Qty: 10 TABLET | Refills: 0 | Status: SHIPPED | OUTPATIENT
Start: 2023-10-26

## 2023-10-26 RX ADMIN — MIDAZOLAM HYDROCHLORIDE 2 MG: 1 INJECTION INTRAMUSCULAR; INTRAVENOUS at 10:17:00

## 2023-10-26 RX ADMIN — SODIUM CHLORIDE, SODIUM LACTATE, POTASSIUM CHLORIDE, CALCIUM CHLORIDE: 600; 310; 30; 20 INJECTION, SOLUTION INTRAVENOUS at 10:17:00

## 2023-10-26 RX ADMIN — CEFAZOLIN SODIUM/WATER 2 G: 2 G/20 ML SYRINGE (ML) INTRAVENOUS at 10:40:00

## 2023-10-26 RX ADMIN — ONDANSETRON 4 MG: 2 INJECTION INTRAMUSCULAR; INTRAVENOUS at 11:30:00

## 2023-10-26 RX ADMIN — GLYCOPYRROLATE 0.8 MG: 0.2 INJECTION, SOLUTION INTRAMUSCULAR; INTRAVENOUS at 11:25:00

## 2023-10-26 RX ADMIN — KETOROLAC TROMETHAMINE 30 MG: 30 INJECTION, SOLUTION INTRAMUSCULAR; INTRAVENOUS at 11:30:00

## 2023-10-26 RX ADMIN — ROCURONIUM BROMIDE 10 MG: 10 INJECTION, SOLUTION INTRAVENOUS at 10:22:00

## 2023-10-26 RX ADMIN — DEXAMETHASONE SODIUM PHOSPHATE 4 MG: 4 MG/ML VIAL (ML) INJECTION at 10:22:00

## 2023-10-26 RX ADMIN — NEOSTIGMINE METHYLSULFATE 5 MG: 1 INJECTION, SOLUTION INTRAVENOUS at 11:25:00

## 2023-10-26 RX ADMIN — ROCURONIUM BROMIDE 30 MG: 10 INJECTION, SOLUTION INTRAVENOUS at 10:40:00

## 2023-10-26 RX ADMIN — LIDOCAINE HYDROCHLORIDE 50 MG: 10 INJECTION, SOLUTION EPIDURAL; INFILTRATION; INTRACAUDAL; PERINEURAL at 10:22:00

## 2023-10-26 RX ADMIN — ROCURONIUM BROMIDE 10 MG: 10 INJECTION, SOLUTION INTRAVENOUS at 10:55:00

## 2023-10-26 RX ADMIN — METOCLOPRAMIDE HYDROCHLORIDE 10 MG: 5 INJECTION INTRAMUSCULAR; INTRAVENOUS at 10:22:00

## 2023-10-26 NOTE — H&P
History & Physical Examination    Patient Name: Elaine Hassan  MRN: [id number]  [facility]: [id number]  YOB: 1960    Diagnosis: Right inguinal hernia, abdominal wall lipoma    Present Illness: The patient is a 59-year-old gentleman known to me for prior laparoscopic cholecystectomy, performed November 26, 2022. The patient presents with a new issue. He states over the last 2 months, he has noticed pain in his right groin. He feels a ball there. He cannot lie on his right side at night. This prompted an ultrasound, which confirmed a right inguinal hernia. The patient also has a history of multiple lipomas. He feels a new one growing on his right side abdominal wall.    ergocalciferol 1.25 MG (81401 UT) Oral Cap, Take 1 capsule (50,000 Units total) by mouth once a week. Total of 12 weeks. , Disp: 12 capsule, Rfl: 0, Past Week  aspirin 81 MG Oral Tab, Take 1 tablet (81 mg total) by mouth daily. , Disp: , Rfl: 0, 10/11/2023      [MAR Hold] acetaminophen (Tylenol Extra Strength) tab 1,000 mg, 1,000 mg, Oral, Once  [MAR Hold] scopolamine (Transderm-Scop) 1 MG/3DAYS patch 1 patch, 1 patch, Transdermal, Once  lactated ringers infusion, , Intravenous, Continuous  ceFAZolin (Ancef) 2 g in 20mL IV syringe premix, 2 g, Intravenous, Once  heparin (Porcine) 5000 UNIT/ML injection, , ,   ceFAZolin (Ancef) 2 g/20mL IV syringe premix, , ,         Allergies: No Known Allergies    Past Medical History:   Diagnosis Date    Acute, but ill-defined, cerebrovascular disease 2014    Blood in the stool 10/19/2022    Hemorrhoids 10/05/2022    High cholesterol 10/05/2022    Other and unspecified hyperlipidemia     Visual impairment     Vitamin D deficiency     Wears glasses 2012     Past Surgical History:   Procedure Laterality Date    ANESTH,KNEE AREA SURGERY      torn ACL left    APPENDECTOMY  03/2009    APPENDECTOMY      CHOLECYSTECTOMY      HEMORRHOIDECTOMY,INT/EXT,SIMPLE  2018    OTHER SURGICAL HISTORY      left knee surgery    OTHER SURGICAL HISTORY      lipomas    REMOVAL GALLBLADDER      SURGERY - INTERNAL Left     testicle during childhood     Family History   Problem Relation Age of Onset    Other (grave's disease) Son     Other (Other) Mother         OA    Other (CVA) Mother     Stroke Mother     Cancer Father         prostate cancer     Social History    Tobacco Use      Smoking status: Never      Smokeless tobacco: Never    Alcohol use: Yes      Alcohol/week: 3.0 standard drinks of alcohol      Types: 1 Glasses of wine, 1 Cans of beer, 1 Shots of liquor per week      Comment: wine on occasion      SYSTEM Check if Review is Normal Check if Physical Exam is Normal If not normal, please explain:   HEENT [ x] [ x]    [place] [ x] [ x]    HEART [ x] [ x]    LUNGS [ x] [ x]    ABDOMEN [ ] [ ] Right inguinal hernia, abdominal wall lipoma   UROGENITAL [ x] [x ]    EXTREMITIES [ x] [x ]    OTHER        [ x ] I have discussed the risks and benefits and alternatives with the patient/family. They understand and agree to proceed with plan of care. [ x ] I have reviewed the History and Physical done within the last 30 days. Any changes noted above.     Fiorella Martel MD  10/26/2023  9:46 AM

## 2023-10-26 NOTE — DISCHARGE INSTRUCTIONS
Home Care Instructions  Laparoscopic Inguinal Hernia  Dr. Alfonse Goldberg  For post-operative pain control the medications is usually Norco.  These are narcotics and are best taken by starting with one tablet and repeating every four to six hours as needed. If you do not feel you need the Norco, do not take it. If the pain is severe the patient may take up to two pills every four hours. I would like you to take the prescribed Motrin every 6 hours, around-the-clock, as a scheduled medication. Always follow all label directions. You may resume taking aspirin on Sunday, October 29. All other home medications may be resumed as scheduled. DIET  The patient may resume a general diet immediately. This is not a good time to eat excessively. The patient should eat in moderation and stick with foods the patient feels are easy to digest.  There should be no alcohol consumption in the immediate recover time period or within six hours of taking narcotics. WOUND CARE  The top dressing may be removed the day after surgery. This includes the gauze, tape and band-aids if they are present. Do not remove the steri-strips or butterfly tapes that are white and adherent to the skin. The steri-strips will eventually peel up at the ends and at this point they may be removed. This is usually seven to ten days after surgery. The patient may shower the day after surgery. There is no need to cover the incisions and all top gauze type dressing should be removed prior to showering. Soap can get on the wounds but do not scrub over the wounds. No hair dye or chemicals of any kind should get in the wounds. Avoid tub baths for two weeks. If visible sutures or staples are present they will be removed in the office by the surgeon or nurse. Most wounds will be closed with dissolving suture underneath the skin. These sutures will dissolve on their own.     ACTIVITY  Every day the patient should be up, showered and dressed. Each day the patient should be up and around the house. The patient should not lie in bed and should not stay in pajamas. We count on the patient being up, coughing, walking and deep breathing to avoid pneumonia and blood clots in the legs. Once a day the patient should get out of the house and go shopping, go to the mall, the hardware store, the movies or a restaurant. The patient may ride in a car but should not drive the car for at least one week. Patients should be off narcotics for at least 8 hours prior to being the . The average time off work is 10 to 14 days; most adults will be seeing the surgeon prior to returning to work. Patients may go up and down stairs and lift up to five pounds but no bending, pushing or pulling. Nothing called work or exercise until the follow up visit. No power walking, jogging or working out until the follow up visit. Patients should seek further activity limits at the time of their appointment. If the patient is unable to urinate and feels a painful and full bladder call us immediately. APPOINTMENT  Please call our office today for an appointment within five to ten days of discharge. Any fever greater than 100.5, chills, nausea, vomiting, or severe diarrhea, please call our office. If the wound turns red, hot, swollen, becomes increasingly painful, or drains pus call us immediately at [phone]. For life threatening emergencies call 911. For non-emergent care please call our office after 8:30 a.m. Monday through Friday. The number listed above is our office number, and our phone automatically switches to our answering service if we are not there. Please do not use the emergency room for nonurgent care. Thank you for entrusting us with your care.   [facility]--General Surgery

## 2023-10-26 NOTE — PROGRESS NOTES
I was called to the patient's bedside due to significant pain. His pain is directly at his hernia site. It hurts worse when going from a supine to sitting position or sitting to standing position. Once standing or sitting, he is comfortable. The pain does not radiate to his testicles. He has some mild oozing from his incision sites, but there is no ecchymosis or swelling in the right inguinal region. His pain is most likely muscular. I recommended an overnight stay to help better control his pain prior to discharge home. After discussion with the patient and his son (via telephone), the patient will spend the night.     Cassandra Fitch MD

## 2023-10-26 NOTE — OPERATIVE REPORT
[facility]  Op Note    Maira Clonts Location: OR   CSN [id number] MRN [id number]   Admission Date 10/26/2023 Operation Date 10/26/2023   Attending Physician María Best MD Operating Physician Yahir Velarde MD   DATE OF OPERATION:  10/26/2023  PREOPERATIVE DIAGNOSIS:  Right inguinal hernia, abdominal wall lipoma. POSTOPERATIVE DIAGNOSIS:  Right inguinal hernia, abdominal wall lipoma. PROCEDURE PERFORMED: Laparoscopic right inguinal hernia repair with mesh (Marlex mesh used) excision of abdominal wall lipoma (2.6 cm). SURGEON:  Anjel Canchola M.D.  ASSISTANT: SUSANNE Hardwick  ANESTHESIA: General.   SPECIMEN: None. BLOOD LOSS: 10 mL. COMPLICATIONS: None. INDICATIONS FOR PROCEDURE: The patient is a 16-year-old gentleman who noticed right groin pain. Physical exam confirmed the presence of a right inguinal hernia. The patient was offered laparoscopic inguinal hernia repair with mesh. The patient also has a history of abdominal wall lipomas. He feels a new one growing. He was offered excision of this lipoma. The risks, benefits, and alternatives of this were discussed in detail with the patient. Risks included, but were not limited to, recurrence of the hernia, bleeding, wound infection, and injury to the cord vessels and spermatic cord itself. We also discussed possible recurrence of his lipomas. The patient was agreeable to proceed with the operation. OPERATIVE TECHNIQUE:  After informed consent was obtained, patient was taken to the operating room and placed in supine position. General anesthesia was induced, and a Lassiter catheter was placed. The abdomen was then prepped and draped in the usual sterile fashion. Beginning with the lipoma, using the 11 blade scalpel, an incision was made over the area of greatest prominence of the lipoma. A hemostat was used to bluntly dissect the lipoma free from the surrounding tissue. The lipoma was then delivered from the wound. Cautery was used to obtain hemostasis. The skin was then closed using a 4-0 Vicryl suture. Turning attention to the laparoscopy, a curvilinear incision was made through prior scar inferior to the umbilicus with an 11 blade scalpel. The Veress needle was passed into the abdomen, and pneumoperitoneum was instituted to a pressure of 15 without difficulty. The 11 mm trocar was passed to this incision site. The abdomen was inspected and found to be grossly normal except for the visualized right inguinal hernia. Under direct vision, bilateral mid abdominal 5 mm ports were placed. The patient was placed in Trendelenburg position. Attention was turned to the inguinal region where the hernia defect was identified. The peritoneum was grasped, retracted and divided. Once the peritoneum was incised, blunt and sharp dissection was used with judicious use of electrocautery to achieve hemostasis. Medial to lateral dissection was accomplished identifying initially the pubic tubercle and Claudy's ligament, and the soft tissues were dissected laterally to create a sufficient pocket to accommodate mesh. Next, the hernia sac was grasped and retracted. Blunt and sharp dissection technique was utilized to reduce the hernia sac into the abdominal cavity. Once this was accomplished, a Marlex mesh was passed on the field. It was trimmed to size and passed down the umbilical port site. It was placed in the inguinal region and secured with the tacker. Once the mesh was in place, the peritoneal flap was then reapproximated using the tacker. The pneumoperitoneum was released, and all instruments had been removed under direct vision as well. The fascia at the umbilical port site was closed with an 0 Maxon suture. The skin at all incision sites was closed with a 4-0 Vicryl suture. Marcaine 0.5% with epinephrine was injected into the incisions to help with postoperative pain control.   Steri-Strips and Mastisol were placed across the incisions as well as sterile dressings. The patient tolerated the procedure well, was extubated in the OR, and went to the PACU in good condition.   Aubrey Lyon MD

## 2023-10-27 VITALS
DIASTOLIC BLOOD PRESSURE: 71 MMHG | SYSTOLIC BLOOD PRESSURE: 123 MMHG | HEIGHT: 69 IN | WEIGHT: 189 LBS | RESPIRATION RATE: 18 BRPM | TEMPERATURE: 97 F | HEART RATE: 52 BPM | BODY MASS INDEX: 27.99 KG/M2 | OXYGEN SATURATION: 94 %

## 2023-10-27 LAB
ANION GAP SERPL CALC-SCNC: 4 MMOL/L (ref 0–18)
BASOPHILS # BLD AUTO: 0.01 X10(3) UL (ref 0–0.2)
BASOPHILS NFR BLD AUTO: 0.1 %
BUN BLD-MCNC: 10 MG/DL (ref 7–18)
CALCIUM BLD-MCNC: 8.7 MG/DL (ref 8.5–10.1)
CHLORIDE SERPL-SCNC: 109 MMOL/L (ref 98–112)
CO2 SERPL-SCNC: 28 MMOL/L (ref 21–32)
CREAT BLD-MCNC: 0.78 MG/DL
EGFRCR SERPLBLD CKD-EPI 2021: 100 ML/MIN/1.73M2 (ref 60–?)
EOSINOPHIL # BLD AUTO: 0.11 X10(3) UL (ref 0–0.7)
EOSINOPHIL NFR BLD AUTO: 0.9 %
ERYTHROCYTE [DISTWIDTH] IN BLOOD BY AUTOMATED COUNT: 12.3 %
GLUCOSE BLD-MCNC: 109 MG/DL (ref 70–99)
HCT VFR BLD AUTO: 40.9 %
HGB BLD-MCNC: 13.6 G/DL
IMM GRANULOCYTES # BLD AUTO: 0.03 X10(3) UL (ref 0–1)
IMM GRANULOCYTES NFR BLD: 0.2 %
LYMPHOCYTES # BLD AUTO: 1.6 X10(3) UL (ref 1–4)
LYMPHOCYTES NFR BLD AUTO: 13.1 %
MCH RBC QN AUTO: 31.4 PG (ref 26–34)
MCHC RBC AUTO-ENTMCNC: 33.3 G/DL (ref 31–37)
MCV RBC AUTO: 94.5 FL
MONOCYTES # BLD AUTO: 0.87 X10(3) UL (ref 0.1–1)
MONOCYTES NFR BLD AUTO: 7.1 %
NEUTROPHILS # BLD AUTO: 9.62 X10 (3) UL (ref 1.5–7.7)
NEUTROPHILS # BLD AUTO: 9.62 X10(3) UL (ref 1.5–7.7)
NEUTROPHILS NFR BLD AUTO: 78.6 %
OSMOLALITY SERPL CALC.SUM OF ELEC: 292 MOSM/KG (ref 275–295)
PLATELET # BLD AUTO: 180 10(3)UL (ref 150–450)
POTASSIUM SERPL-SCNC: 3.6 MMOL/L (ref 3.5–5.1)
RBC # BLD AUTO: 4.33 X10(6)UL
SODIUM SERPL-SCNC: 141 MMOL/L (ref 136–145)
WBC # BLD AUTO: 12.2 X10(3) UL (ref 4–11)

## 2023-10-27 PROCEDURE — 85025 COMPLETE CBC W/AUTO DIFF WBC: CPT | Performed by: PHYSICIAN ASSISTANT

## 2023-10-27 PROCEDURE — 80048 BASIC METABOLIC PNL TOTAL CA: CPT | Performed by: PHYSICIAN ASSISTANT

## 2023-10-27 RX ORDER — IBUPROFEN 600 MG/1
600 TABLET ORAL EVERY 6 HOURS
Qty: 30 TABLET | Refills: 0 | Status: SHIPPED | OUTPATIENT
Start: 2023-10-27

## 2023-10-27 RX ORDER — IBUPROFEN 600 MG/1
600 TABLET ORAL EVERY 6 HOURS
Status: DISCONTINUED | OUTPATIENT
Start: 2023-10-27 | End: 2023-10-27

## 2023-10-27 NOTE — PLAN OF CARE
Patients IV d/c'd, catheter intact. Video  services used with [employer] language. RN went through discharge instructions, wound care, follow up and when to seek medical attention. Patient wanted incisional sites covered with gauze for the ride home, RN instructed patient to remove gauze when he is home and keep open to air. Patient verbalized understanding of wound care.  RN will discharge patient with wheelchair when his ride is here

## 2023-10-27 NOTE — PLAN OF CARE
NURSING ADMISSION NOTE      Patient admitted via Cart  Oriented to room. Safety precautions initiated. Bed in low position. Call light in reach. Pt alert x 4. VSS on RA. Medicated for pain Tolerating clear liquids. No nausea Voiding. IV at [facility] rate. No other complaints at this time.

## 2023-10-30 ENCOUNTER — PATIENT OUTREACH (OUTPATIENT)
Dept: CASE MANAGEMENT | Age: 63
End: 2023-10-30

## 2023-11-03 ENCOUNTER — OFFICE VISIT (OUTPATIENT)
Facility: LOCATION | Age: 63
End: 2023-11-03
Payer: COMMERCIAL

## 2023-11-03 VITALS — HEART RATE: 70 BPM | TEMPERATURE: 97 F

## 2023-11-03 DIAGNOSIS — Z98.890 POSTOPERATIVE STATE: Primary | ICD-10-CM

## 2023-11-03 DIAGNOSIS — K40.90 RIGHT INGUINAL HERNIA: ICD-10-CM

## 2023-11-03 DIAGNOSIS — D17.1 LIPOMA OF ABDOMINAL WALL: ICD-10-CM

## 2023-11-03 PROCEDURE — 99024 POSTOP FOLLOW-UP VISIT: CPT

## 2024-02-23 ENCOUNTER — TELEPHONE (OUTPATIENT)
Dept: FAMILY MEDICINE CLINIC | Facility: CLINIC | Age: 64
End: 2024-02-23

## 2024-02-23 NOTE — TELEPHONE ENCOUNTER
Pt stopped into the office to schedule apt with . Pt complains that after having hernia surgery 6 months ago he is still having pain at the surgery site. Pt also stated that he has been taking an aspirin every days for many many years. He stopped that a couple weeks ago and has been having a bloody nose daily since stopping.     Pt has been scheduled for 3/7/24. Pt is asking if he can be seen sooner because his pain level is high at the hernia site. Please advise.

## 2024-02-23 NOTE — TELEPHONE ENCOUNTER
*Below should say can see pt can Soheila sooner and then cancel with Dr Salazar    Tc to pt.    When asking if he called surgeon with his pain he sts he has hard time with english  I offered to call with [facility] , he agrees     I called  line and s/w pt with  Hyun ID#[id number]    Pt reports intermittent pain RLQ at site of his hernia when he stands or bends, this has been occurring past few mos  Pain went away after he saw surgeon for a few weeks but then returned.  Taking ibu--helps but pain comes back    No n/v/d/f  No swelling    Reports he wants to be referred to another specialist for a second opinion but he is HMO and would need a referral.    Also reports nose bleeds intermittent since a few mos ago and would like to discuss    Reports he stopped taking aspirin at some point and the nosebleeds stopped, now taking asa aging and gets nose bleeds    No light headed or whoozy  No blood in stool    Pt agrees to see Soheila sooner for his sx's. Asking for am as he works in afternoon.    Appt booked for Monday. I cancelled March appt he had with Dr VILLALOBOS. Advised if his sx's worsen prior--urgent care or ER.    He voiced understanding.

## 2024-02-23 NOTE — TELEPHONE ENCOUNTER
See below. Will triage pt but if stable--can he see Soheila next week?  Pending sx's may send to urgent care/ER today

## 2024-02-26 ENCOUNTER — OFFICE VISIT (OUTPATIENT)
Dept: FAMILY MEDICINE CLINIC | Facility: CLINIC | Age: 64
End: 2024-02-26
Payer: COMMERCIAL

## 2024-02-26 ENCOUNTER — LAB ENCOUNTER (OUTPATIENT)
Dept: LAB | Age: 64
End: 2024-02-26
Payer: COMMERCIAL

## 2024-02-26 VITALS
BODY MASS INDEX: 29.18 KG/M2 | OXYGEN SATURATION: 98 % | HEART RATE: 72 BPM | DIASTOLIC BLOOD PRESSURE: 72 MMHG | SYSTOLIC BLOOD PRESSURE: 118 MMHG | HEIGHT: 69 IN | WEIGHT: 197 LBS | RESPIRATION RATE: 18 BRPM

## 2024-02-26 DIAGNOSIS — R10.31 RIGHT GROIN PAIN: Primary | ICD-10-CM

## 2024-02-26 DIAGNOSIS — R04.0 FREQUENT NOSEBLEEDS: ICD-10-CM

## 2024-02-26 DIAGNOSIS — Z98.890 HISTORY OF REPAIR OF INGUINAL HERNIA: ICD-10-CM

## 2024-02-26 DIAGNOSIS — R10.31 RIGHT GROIN PAIN: ICD-10-CM

## 2024-02-26 DIAGNOSIS — E55.9 VITAMIN D DEFICIENCY: ICD-10-CM

## 2024-02-26 DIAGNOSIS — D22.9 MULTIPLE NEVI: ICD-10-CM

## 2024-02-26 DIAGNOSIS — Z87.19 HISTORY OF REPAIR OF INGUINAL HERNIA: ICD-10-CM

## 2024-02-26 DIAGNOSIS — L98.9 SKIN LESION OF CHEEK: ICD-10-CM

## 2024-02-26 DIAGNOSIS — E78.00 HYPERCHOLESTEREMIA: ICD-10-CM

## 2024-02-26 DIAGNOSIS — R10.31 RLQ ABDOMINAL PAIN: ICD-10-CM

## 2024-02-26 PROBLEM — R35.1 NOCTURIA: Status: ACTIVE | Noted: 2022-06-23

## 2024-02-26 PROBLEM — N40.0 ENLARGED PROSTATE: Status: ACTIVE | Noted: 2022-06-23

## 2024-02-26 LAB
BASOPHILS # BLD AUTO: 0.03 X10(3) UL (ref 0–0.2)
BASOPHILS NFR BLD AUTO: 0.6 %
CHOLEST SERPL-MCNC: 190 MG/DL (ref ?–200)
EOSINOPHIL # BLD AUTO: 0.28 X10(3) UL (ref 0–0.7)
EOSINOPHIL NFR BLD AUTO: 5.1 %
ERYTHROCYTE [DISTWIDTH] IN BLOOD BY AUTOMATED COUNT: 12.3 %
FASTING PATIENT LIPID ANSWER: YES
HCT VFR BLD AUTO: 46.9 %
HDLC SERPL-MCNC: 58 MG/DL (ref 40–59)
HGB BLD-MCNC: 15.8 G/DL
IMM GRANULOCYTES # BLD AUTO: 0.02 X10(3) UL (ref 0–1)
IMM GRANULOCYTES NFR BLD: 0.4 %
LDLC SERPL CALC-MCNC: 116 MG/DL (ref ?–100)
LYMPHOCYTES # BLD AUTO: 1.44 X10(3) UL (ref 1–4)
LYMPHOCYTES NFR BLD AUTO: 26.5 %
MCH RBC QN AUTO: 31.3 PG (ref 26–34)
MCHC RBC AUTO-ENTMCNC: 33.7 G/DL (ref 31–37)
MCV RBC AUTO: 93.1 FL
MONOCYTES # BLD AUTO: 0.52 X10(3) UL (ref 0.1–1)
MONOCYTES NFR BLD AUTO: 9.6 %
NEUTROPHILS # BLD AUTO: 3.15 X10 (3) UL (ref 1.5–7.7)
NEUTROPHILS # BLD AUTO: 3.15 X10(3) UL (ref 1.5–7.7)
NEUTROPHILS NFR BLD AUTO: 57.8 %
NONHDLC SERPL-MCNC: 132 MG/DL (ref ?–130)
PLATELET # BLD AUTO: 207 10(3)UL (ref 150–450)
RBC # BLD AUTO: 5.04 X10(6)UL
TRIGL SERPL-MCNC: 89 MG/DL (ref 30–149)
VIT D+METAB SERPL-MCNC: 49.3 NG/ML (ref 30–100)
VLDLC SERPL CALC-MCNC: 15 MG/DL (ref 0–30)
WBC # BLD AUTO: 5.4 X10(3) UL (ref 4–11)

## 2024-02-26 PROCEDURE — 85025 COMPLETE CBC W/AUTO DIFF WBC: CPT

## 2024-02-26 PROCEDURE — 80061 LIPID PANEL: CPT

## 2024-02-26 PROCEDURE — 82306 VITAMIN D 25 HYDROXY: CPT

## 2024-02-26 NOTE — PATIENT INSTRUCTIONS
Ayr nasal gel daily  Humidifier at bedside  Restart taking Aspirin 81 mg daily   Schedule appointment with Ear/Nose/Throat doctor    Complete ultrasounds.   Schedule appointment with general surgery    Schedule appointment with dermatology

## 2024-02-26 NOTE — PROGRESS NOTES
[facility] Family Medicine Office Note  Chief Complaint:   Chief Complaint   Patient presents with    Pain     Pt reports intermittent pain RLQ at site of his hernia when he stands or bends, this has been occurring past few mos    Other     Nosebleeds off and on after taking his asprin, if he does not take the asprin he does not bleed. Lesion on the face not healing has been there over a year       HPI:   This is a 63 year old male coming in with multiple concerns.     Patient had laparoscopic right inguinal hernia repair with mesh and excision of abdominal wall lipoma with  on 10/26/2023. Since his surgery he reports he has been experiencing right lower abdominal pain with bending and twisting motions. Pain is 5/10 in intensity, and is sharp and burning in nature. No pain at rest. Denies nausea, vomiting, fevers, constipation, or diarrhea.     Patient reports he has noticed small blood clots from right nares while blowing his nose. Symptoms started around the New year, and would occur nearly every day. Amount of blood was scant, did not have a full nosebleed. He stopped taking Aspirin 81mg daily about 1 week ago and has not had any recurrence of symptoms. 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   No bleeding gums, easy bruising, or blood in the stool.     Reports scaly lesion on left cheek that he has had for 1 year. Lesion becomes irritated after shaving. He has not noticed any bleeding or changes in size/shape.       Past Medical History:   Diagnosis Date    Acute, but ill-defined, cerebrovascular disease 2014    Blood in the stool 10/19/2022    Hemorrhoids 10/05/2022    High cholesterol 10/05/2022    Other and unspecified hyperlipidemia     Visual impairment     Vitamin D deficiency     Wears glasses 2012     Past Surgical History:   Procedure Laterality Date    ANESTH,KNEE AREA SURGERY      torn ACL left    APPENDECTOMY  03/2009     APPENDECTOMY      CHOLECYSTECTOMY      HEMORRHOIDECTOMY,INT/EXT,SIMPLE  2018    OTHER SURGICAL HISTORY      left knee surgery    OTHER SURGICAL HISTORY      lipomas    REMOVAL GALLBLADDER      SURGERY - INTERNAL Left     testicle during childhood     Social History:  Social History     Socioeconomic History    Marital status:    Tobacco Use    Smoking status: Never    Smokeless tobacco: Never   Vaping Use    Vaping Use: Never used   Substance and Sexual Activity    Alcohol use: Yes     Alcohol/week: 3.0 standard drinks of alcohol     Types: 1 Glasses of wine, 1 Cans of beer, 1 Shots of liquor per week     Comment: wine on occasion    Drug use: Never   Other Topics Concern    Caffeine Concern Yes     Comment: 2-3 cups per day coffee    Exercise Yes     Comment: treadmill at home 30 mins 2-3x/wk    Seat Belt Yes     Social Determinants of Health     Food Insecurity: No Food Insecurity (10/26/2023)    Food Insecurity     Food Insecurity: Never true   Transportation Needs: No Transportation Needs (10/26/2023)    Transportation Needs     Lack of Transportation: No   Housing Stability: Low Risk  (10/26/2023)    Housing Stability     Housing Instability: No     Family History:  Family History   Problem Relation Age of Onset    Other (grave's disease) Son     Other (Other) Mother         OA    Other (CVA) Mother     Stroke Mother     Cancer Father         prostate cancer     Allergies:  No Known Allergies  Current Meds:  Current Outpatient Medications   Medication Sig Dispense Refill    aspirin 81 MG Oral Tab Take 1 tablet (81 mg total) by mouth daily.      ibuprofen 600 MG Oral Tab Take 1 tablet (600 mg total) by mouth every 6 (six) hours. Take with some food (Patient not taking: Reported on 2/26/2024) 30 tablet 0    HYDROcodone-acetaminophen (NORCO) 5-325 MG Oral Tab Take 1 tablet by mouth every 6 (six) hours as needed for Pain. (Patient not taking: Reported on 2/26/2024) 10 tablet 0    ergocalciferol 1.25 MG  (96354 UT) Oral Cap Take 1 capsule (50,000 Units total) by mouth once a week. Total of 12 weeks. (Patient not taking: Reported on 2/26/2024) 12 capsule 0      Counseling given: Not Answered       REVIEW OF SYSTEMS:   Review of Systems   Constitutional: Negative.  Negative for chills and fatigue.   HENT:  Negative for nosebleeds.         +bloody discharge from right nares   Eyes: Negative.    Respiratory: Negative.     Cardiovascular: Negative.    Gastrointestinal:  Positive for abdominal pain. Negative for nausea, vomiting, diarrhea, constipation, blood in stool and abdominal distention.   Endocrine: Negative.    Genitourinary: Negative.    Musculoskeletal: Negative.    Skin:         +lesion of left cheek   Allergic/Immunologic: Negative.    Neurological: Negative.    Hematological: Negative.    Psychiatric/Behavioral: Negative.           EXAM:   /72 (BP Location: Right arm, Patient Position: Sitting, Cuff Size: adult)   Pulse 72   Resp 18   Ht 5' 9\" (1.753 m)   Wt 197 lb (89.4 kg)   SpO2 98%   BMI 29.09 kg/m²  Estimated body mass index is 29.09 kg/m² as calculated from the following:    Height as of this encounter: 5' 9\" (1.753 m).    Weight as of this encounter: 197 lb (89.4 kg).   Vital signs reviewed.Appears stated age, well groomed.  Physical Exam  Constitutional:       Appearance: Normal appearance.   HENT:      Head: Normocephalic and atraumatic.      Right Ear: Tympanic membrane normal.      Left Ear: Tympanic membrane normal.      Nose: Nose normal. No nasal deformity, nasal tenderness, congestion or rhinorrhea.      Right Nostril: No foreign body or epistaxis.      Left Nostril: No foreign body or epistaxis.      Mouth/Throat:      Mouth: Mucous membranes are moist.      Pharynx: Oropharynx is clear. No oropharyngeal exudate or posterior oropharyngeal erythema.   Eyes:      Extraocular Movements: Extraocular movements intact.      Conjunctiva/sclera: Conjunctivae normal.      Pupils: Pupils are  equal, round, and reactive to light.   Cardiovascular:      Rate and Rhythm: Normal rate and regular rhythm.      Pulses: Normal pulses.      Heart sounds: Normal heart sounds.   Pulmonary:      Effort: Pulmonary effort is normal.      Breath sounds: Normal breath sounds.   Abdominal:      General: Abdomen is flat. Bowel sounds are normal. There is no distension.      Palpations: Abdomen is soft. There is no mass.      Tenderness: There is no abdominal tenderness. There is no guarding.      Hernia: No hernia is present. There is no hernia in the umbilical area or right inguinal area.   Musculoskeletal:      Cervical back: Normal range of motion. No tenderness.   Lymphadenopathy:      Cervical: No cervical adenopathy.   Skin:     General: Skin is warm and dry.      Capillary Refill: Capillary refill takes less than 2 seconds.      Findings: Rash present. Rash is scaling.      Comments: +multiple nevi. There is a scaling 3x4mm lesion of the left cheek.    Neurological:      General: No focal deficit present.      Mental Status: She is alert and oriented to person, place, and time.   Psychiatric:         Mood and Affect: Mood normal.         Behavior: Behavior normal.             ASSESSMENT AND PLAN:   1. Right groin pain  - Refer to General Surgery  - US ABDOMEN LIMITED (CPT=76705); Future  - CBC W Differential W Platelet [E]; Future  - No hernia is noted on examination; no tenderness of the abdomen or groin  - Will obtain US groin and US abdomen to further assess  - Advised to schedule appointment with general surgery     2. RLQ abdominal pain  - Refer to General Surgery  - US ABDOMEN LIMITED (CPT=76705); Future  - CBC W Differential W Platelet [E]; Future    3. History of repair of inguinal hernia  - Refer to General Surgery  - US GROIN BILATERAL (CPT=76882-50); Future  - US ABDOMEN LIMITED (CPT=76705); Future    4. 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  - Can try Ayr nasal gel, humidifier at bedside     5. Multiple nevi  - Derm Referral - In Network    6. Skin lesion of cheek  - Derm Referral - In Network  - Schedule appointment with dermatology for further evaluation           Meds & Refills for this Visit:  Requested Prescriptions      No prescriptions requested or ordered in this encounter       Health Maintenance:  Health Maintenance Due   Topic Date Due    Zoster Vaccines (1 of 2) Never done    COVID-19 Vaccine (3 - 2023-24 season) 09/01/2023    Influenza Vaccine (1) Never done    Annual Depression Screening  01/01/2024       Patient/Caregiver Education: Patient/Caregiver Education: There are no barriers to learning. Medical education done.   Outcome: Patient verbalizes understanding. Patient is notified to call with any questions, complications, allergies, or worsening or changing symptoms.  Patient is to call with any side effects or complications from the treatments as a result of today.     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    Neoplasm of uncertain behavior of soft tissues of lower extremity    Neoplasm of uncertain behavior of soft tissues of abdomen    Vitamin D deficiency    Hyperlipidemia    Fracture of distal phalanx of right great toe    Intestinal bleeding    Grade III internal hemorrhoids    Erectile dysfunction    Old pontine infarct without late effect    Vertebrobasilar dolichoectasia    Thyroid nodule    Hypercholesteremia    Soft tissue mass, lower back    Occult Blood/Abnormal Stool    Benign neoplasm of cecum    Benign neoplasm of descending colon    Benign neoplasm of sigmoid colon    Hypokalemia    Hyperglycemia    Leukocytosis    Azotemia    History of adenomatous polyp of colon    Enlarged  prostate    Nocturia       Soheila Banks, EBONI      Total time appointment length: 53 minutes in which time was spent on chart review, examination, documentation, counseling and coordination of care.    Please note that portions of this note may have been completed with a voice recognition program. Efforts were made to edit the dictations but occasionally words are mis-transcribed.    The 21st Century Cures Act makes medical notes like these available to patients in the interest of transparency. Please be advised this is a medical document. Medical documents are intended to carry relevant information, facts as evident, and the clinical opinion of the practitioner. The medical note is intended as peer to peer communication and may appear blunt or direct. It is written in medical language and may contain abbreviations or verbiage that are unfamiliar.

## 2024-03-05 ENCOUNTER — HOSPITAL ENCOUNTER (OUTPATIENT)
Dept: ULTRASOUND IMAGING | Age: 64
Discharge: HOME | End: 2024-03-05
Payer: COMMERCIAL

## 2024-03-05 DIAGNOSIS — R10.31 RLQ ABDOMINAL PAIN: ICD-10-CM

## 2024-03-05 DIAGNOSIS — R10.31 RIGHT GROIN PAIN: ICD-10-CM

## 2024-03-05 DIAGNOSIS — Z87.19 HISTORY OF REPAIR OF INGUINAL HERNIA: ICD-10-CM

## 2024-03-05 DIAGNOSIS — Z98.890 HISTORY OF REPAIR OF INGUINAL HERNIA: ICD-10-CM

## 2024-03-05 PROCEDURE — 76705 ECHO EXAM OF ABDOMEN: CPT

## 2024-03-12 PROCEDURE — 88305 TISSUE EXAM BY PATHOLOGIST: CPT | Performed by: DERMATOLOGY

## 2024-03-13 ENCOUNTER — LAB REQUISITION (OUTPATIENT)
Dept: LAB | Facility: HOSPITAL | Age: 64
End: 2024-03-13
Payer: COMMERCIAL

## 2024-03-13 DIAGNOSIS — C44.91 BASAL CELL CARCINOMA OF SKIN, UNSPECIFIED: ICD-10-CM

## 2024-03-19 ENCOUNTER — OFFICE VISIT (OUTPATIENT)
Facility: LOCATION | Age: 64
End: 2024-03-19
Payer: COMMERCIAL

## 2024-03-19 VITALS — HEART RATE: 71 BPM | TEMPERATURE: 97 F

## 2024-03-19 DIAGNOSIS — Z87.19 STATUS POST INGUINAL HERNIA REPAIR: Primary | ICD-10-CM

## 2024-03-19 DIAGNOSIS — Z98.890 STATUS POST INGUINAL HERNIA REPAIR: Primary | ICD-10-CM

## 2024-03-19 DIAGNOSIS — R10.31 RIGHT INGUINAL PAIN: ICD-10-CM

## 2024-03-19 PROCEDURE — 99213 OFFICE O/P EST LOW 20 MIN: CPT | Performed by: SURGERY

## 2024-03-19 NOTE — H&P
New Patient Visit Note       Active Problems      1. Status post inguinal hernia repair    2. Right inguinal pain        Chief Complaint   Chief Complaint   Patient presents with    New Patient     NP - Right groin pain, hx of inguinal hernia repair, RLQ abdominal pain, experiencing pain after first surgery and when patient bends forward, no other symptoms.       History of Present Illness     The patient is interviewed in the presence of a Citizen of [place] speaking  who joined our conversation via secure audio connection.    63-year-old male with history of laparoscopic right inguinal hernia repair with mesh on 10/26/2024 by Dr. Beltre, presents for evaluation of ongoing right lower quadrant and groin pain.  Patient had significant pain postoperatively, which did require a one night hospitalization.  He reports severe pain did improve within the first few weeks postoperatively; however, he has continued to have pain to a focal spot in his right lower quadrant/right groin since surgery.  He reports pain is exacerbated by bending forward.  He was seen by his PCP on 2/26/2024, in which she noted that he continues to have this pain.  An outpatient ultrasound was obtained which did not reveal any recurrence of hernia.  He was advised to follow-up with our practice.  Patient states pain is only present with certain position changes.  He denies any bulges or concern for recurrence of hernia.  He is otherwise in his usual state of health.  He is tolerating a normal diet, without any nausea or vomiting.  He has normal bowel movements.  Denies any scrotal pain or swelling.  No urinary complaints.    The patient denies fever, chills, chest pain, shortness of breath, dyspnea. The patient also denies hematemesis, melena, or hematochezia. The patient denies change in bowel or bladder habits. There is no complaint of hematuria or dysuria.      Allergies  [facility] has No Known Allergies.    Past Medical / Surgical / Social / Family  History    The past medical and past surgical history have been reviewed by me today.    Past Medical History:   Diagnosis Date    Acute, but ill-defined, cerebrovascular disease 2014    Blood in the stool 10/19/2022    Hemorrhoids 10/05/2022    High cholesterol 10/05/2022    Other and unspecified hyperlipidemia     Visual impairment     Vitamin D deficiency     Wears glasses 2012     Past Surgical History:   Procedure Laterality Date    ANESTH,KNEE AREA SURGERY      torn ACL left    APPENDECTOMY  03/2009    APPENDECTOMY      CHOLECYSTECTOMY      HEMORRHOIDECTOMY,INT/EXT,SIMPLE  2018    OTHER SURGICAL HISTORY      left knee surgery    OTHER SURGICAL HISTORY      lipomas    REMOVAL GALLBLADDER      SURGERY - INTERNAL Left     testicle during childhood       The family history and social history have been reviewed by me today.    Family History   Problem Relation Age of Onset    Other (grave's disease) Son     Other (Other) Mother         OA    Other (CVA) Mother     Stroke Mother     Cancer Father         prostate cancer     Social History     Socioeconomic History    Marital status:    Tobacco Use    Smoking status: Never    Smokeless tobacco: Never   Vaping Use    Vaping Use: Never used   Substance and Sexual Activity    Alcohol use: Yes     Alcohol/week: 3.0 standard drinks of alcohol     Types: 1 Glasses of wine, 1 Cans of beer, 1 Shots of liquor per week     Comment: wine on occasion    Drug use: Never   Other Topics Concern    Caffeine Concern Yes     Comment: 2-3 cups per day coffee    Exercise Yes     Comment: treadmill at home 30 mins 2-3x/wk    Seat Belt Yes        Current Outpatient Medications:     ibuprofen 600 MG Oral Tab, Take 1 tablet (600 mg total) by mouth every 6 (six) hours. Take with some food (Patient not taking: Reported on 2/26/2024), Disp: 30 tablet, Rfl: 0    aspirin 81 MG Oral Tab, Take 1 tablet (81 mg total) by mouth daily., Disp: , Rfl:     HYDROcodone-acetaminophen (NORCO) 5-325  MG Oral Tab, Take 1 tablet by mouth every 6 (six) hours as needed for Pain. (Patient not taking: Reported on 2/26/2024), Disp: 10 tablet, Rfl: 0    ergocalciferol 1.25 MG (85579 UT) Oral Cap, Take 1 capsule (50,000 Units total) by mouth once a week. Total of 12 weeks. (Patient not taking: Reported on 2/26/2024), Disp: 12 capsule, Rfl: 0      Review of Systems  The Review of Systems has been reviewed by me during today.  Review of Systems   Constitutional:  Negative for chills and fever.   Respiratory:  Negative for chest tightness and shortness of breath.    Cardiovascular:  Negative for chest pain.   Gastrointestinal:  Positive for abdominal pain. Negative for abdominal distention, anal bleeding, blood in stool, constipation, diarrhea, nausea, rectal pain and vomiting.   Genitourinary:  Negative for difficulty urinating, flank pain, scrotal swelling and testicular pain.   Musculoskeletal:  Negative for myalgias.   Skin:  Negative for color change and rash.   Neurological:  Negative for weakness and light-headedness.   Psychiatric/Behavioral:  Negative for behavioral problems.        Physical Findings   Pulse 71   Temp 97.1 °F (36.2 °C) (Temporal)   Physical Exam  Vitals and nursing note reviewed.   Constitutional:       General: She is not in acute distress.     Appearance: She is well-developed.   HENT:      Head: Normocephalic and atraumatic.   Eyes:      General: No scleral icterus.     Pupils: Pupils are equal, round, and reactive to light.   Neck:      Vascular: No JVD.      Trachea: No tracheal deviation.   Cardiovascular:      Rate and Rhythm: Normal rate and regular rhythm.   Pulmonary:      Effort: Pulmonary effort is normal. No respiratory distress.      Breath sounds: No stridor.   Abdominal:      General: Bowel sounds are normal. There is no distension.      Palpations: Abdomen is soft. Abdomen is not rigid. There is no mass.      Tenderness: There is no abdominal tenderness. There is no guarding or  rebound. Negative signs include Talamantes's sign and McBurney's sign.      Hernia: No hernia is present. There is no hernia in the left inguinal area or right inguinal area.   Genitourinary:     Penis: Normal and uncircumcised.       Testes: Normal.      Epididymis:      Right: Normal.      Left: Normal.       Musculoskeletal:         General: Normal range of motion.      Cervical back: Normal range of motion and neck supple.   Skin:     General: Skin is warm and dry.   Neurological:      Mental Status: She is alert and oriented to person, place, and time.   Psychiatric:         Behavior: Behavior normal.             Assessment   1. Status post inguinal hernia repair    2. Right inguinal pain          Plan     The patient has ongoing right inguinal pain following laparoscopic right inguinal hernia repair with mesh.  There is no evidence of hernia recurrence on clinical exam or by imaging.  The patient likely has musculoskeletal strain or a component of apraxia secondary to scarring and tacks from the hernia repair.  I recommend a course of physical therapy for range of motion, strengthening, training.  If physical therapy is unsuccessful the patient may benefit from referral to chronic pain specialist for injection, oral treatment or both.  The patient will return to my attention in 4 weeks after physical therapy is complete to assess clinical response and to discuss management options further.  The patient was provided ample opportunity to ask questions.  All of the patient's questions were answered in detail.  The patient voiced understanding of the care plan.     No orders of the defined types were placed in this encounter.      Imaging & Referrals   OP REFERRAL TO [facility]    Follow Up  No follow-ups on file.    Darwin Stratton MD

## 2024-03-19 NOTE — H&P
New Patient Visit Note       Active Problems      No diagnosis found.    Chief Complaint   Chief Complaint   Patient presents with    New Patient     NP - Right groin pain, hx of inguinal hernia repair, RLQ abdominal pain,        History of Present Illness         Allergies  [facility] has No Known Allergies.    Past Medical / Surgical / Social / Family History    The past medical and past surgical history have been reviewed by me today.    Past Medical History:   Diagnosis Date    Acute, but ill-defined, cerebrovascular disease 2014    Blood in the stool 10/19/2022    Hemorrhoids 10/05/2022    High cholesterol 10/05/2022    Other and unspecified hyperlipidemia     Visual impairment     Vitamin D deficiency     Wears glasses 2012     Past Surgical History:   Procedure Laterality Date    ANESTH,KNEE AREA SURGERY      torn ACL left    APPENDECTOMY  03/2009    APPENDECTOMY      CHOLECYSTECTOMY      HEMORRHOIDECTOMY,INT/EXT,SIMPLE  2018    OTHER SURGICAL HISTORY      left knee surgery    OTHER SURGICAL HISTORY      lipomas    REMOVAL GALLBLADDER      SURGERY - INTERNAL Left     testicle during childhood       The family history and social history have been reviewed by me today.    Family History   Problem Relation Age of Onset    Other (grave's disease) Son     Other (Other) Mother         OA    Other (CVA) Mother     Stroke Mother     Cancer Father         prostate cancer     Social History     Socioeconomic History    Marital status:    Tobacco Use    Smoking status: Never    Smokeless tobacco: Never   Vaping Use    Vaping Use: Never used   Substance and Sexual Activity    Alcohol use: Yes     Alcohol/week: 3.0 standard drinks of alcohol     Types: 1 Glasses of wine, 1 Cans of beer, 1 Shots of liquor per week     Comment: wine on occasion    Drug use: Never   Other Topics Concern    Caffeine Concern Yes     Comment: 2-3 cups per day coffee    Exercise Yes     Comment: treadmill at home 30 mins 2-3x/wk    Seat Belt  Yes        Current Outpatient Medications:     ibuprofen 600 MG Oral Tab, Take 1 tablet (600 mg total) by mouth every 6 (six) hours. Take with some food (Patient not taking: Reported on 2/26/2024), Disp: 30 tablet, Rfl: 0    aspirin 81 MG Oral Tab, Take 1 tablet (81 mg total) by mouth daily., Disp: , Rfl:     HYDROcodone-acetaminophen (NORCO) 5-325 MG Oral Tab, Take 1 tablet by mouth every 6 (six) hours as needed for Pain. (Patient not taking: Reported on 2/26/2024), Disp: 10 tablet, Rfl: 0    ergocalciferol 1.25 MG (94941 UT) Oral Cap, Take 1 capsule (50,000 Units total) by mouth once a week. Total of 12 weeks. (Patient not taking: Reported on 2/26/2024), Disp: 12 capsule, Rfl: 0      Review of Systems  The Review of Systems has been reviewed by me during today.  Review of Systems   Constitutional: Negative.    HENT: Negative.     Eyes: Negative.    Respiratory: Negative.     Cardiovascular: Negative.    Gastrointestinal: Negative.    Genitourinary: Negative.    Musculoskeletal: Negative.    Skin: Negative.    Neurological: Negative.    Psychiatric/Behavioral: Negative.         Physical Findings   There were no vitals taken for this visit.  Physical Exam        Assessment   No diagnosis found.      Plan        No orders of the defined types were placed in this encounter.      Imaging & Referrals   None    Follow Up  No follow-ups on file.    Darwin Stratton MD

## 2024-03-20 ENCOUNTER — TELEPHONE (OUTPATIENT)
Dept: PHYSICAL THERAPY | Age: 64
End: 2024-03-20

## 2024-03-26 ENCOUNTER — TELEPHONE (OUTPATIENT)
Dept: PHYSICAL THERAPY | Facility: HOSPITAL | Age: 64
End: 2024-03-26

## 2024-03-27 ENCOUNTER — OFFICE VISIT (OUTPATIENT)
Facility: LOCATION | Age: 64
End: 2024-03-27
Payer: COMMERCIAL

## 2024-03-27 DIAGNOSIS — J34.89 NASAL VESTIBULITIS: ICD-10-CM

## 2024-03-27 DIAGNOSIS — R04.0 EPISTAXIS: Primary | ICD-10-CM

## 2024-03-27 PROCEDURE — 99203 OFFICE O/P NEW LOW 30 MIN: CPT | Performed by: OTOLARYNGOLOGY

## 2024-03-27 NOTE — PROGRESS NOTES
Yanick Rust is a 63 year old male.   Chief Complaint   Patient presents with    Epistaxis     HPI:   He has a history of some right-sided nosebleeds.  He is on aspirin.  He denies nasal congestion.  At times there is pain at that side of the nose.  Current Outpatient Medications   Medication Sig Dispense Refill    mupirocin 2 % External Ointment Apply 1 Application topically 3 (three) times daily. 1 each 3    ibuprofen 600 MG Oral Tab Take 1 tablet (600 mg total) by mouth every 6 (six) hours. Take with some food (Patient not taking: Reported on 2/26/2024) 30 tablet 0    aspirin 81 MG Oral Tab Take 1 tablet (81 mg total) by mouth daily.      HYDROcodone-acetaminophen (NORCO) 5-325 MG Oral Tab Take 1 tablet by mouth every 6 (six) hours as needed for Pain. (Patient not taking: Reported on 2/26/2024) 10 tablet 0    ergocalciferol 1.25 MG (89608 UT) Oral Cap Take 1 capsule (50,000 Units total) by mouth once a week. Total of 12 weeks. (Patient not taking: Reported on 2/26/2024) 12 capsule 0      Past Medical History:   Diagnosis Date    Acute, but ill-defined, cerebrovascular disease 2014    Blood in the stool 10/19/2022    Hemorrhoids 10/05/2022    High cholesterol 10/05/2022    Other and unspecified hyperlipidemia     Visual impairment     Vitamin D deficiency     Wears glasses 2012      Social History:  Social History     Socioeconomic History    Marital status:    Tobacco Use    Smoking status: Never    Smokeless tobacco: Never   Vaping Use    Vaping Use: Never used   Substance and Sexual Activity    Alcohol use: Yes     Alcohol/week: 3.0 standard drinks of alcohol     Types: 1 Glasses of wine, 1 Cans of beer, 1 Shots of liquor per week     Comment: wine on occasion    Drug use: Never   Other Topics Concern    Caffeine Concern Yes     Comment: 2-3 cups per day coffee    Exercise Yes     Comment: treadmill at home 30 mins 2-3x/wk    Seat Belt Yes     Social Determinants of Health     Food Insecurity: No Food  Insecurity (10/26/2023)    Food Insecurity     Food Insecurity: Never true   Transportation Needs: No Transportation Needs (10/26/2023)    Transportation Needs     Lack of Transportation: No   Housing Stability: Low Risk  (10/26/2023)    Housing Stability     Housing Instability: No      Past Surgical History:   Procedure Laterality Date    ANESTH,KNEE AREA SURGERY      torn ACL left    APPENDECTOMY  03/2009    APPENDECTOMY      CHOLECYSTECTOMY      HEMORRHOIDECTOMY,INT/EXT,SIMPLE  2018    OTHER SURGICAL HISTORY      left knee surgery    OTHER SURGICAL HISTORY      lipomas    REMOVAL GALLBLADDER      SURGERY - INTERNAL Left     testicle during childhood         REVIEW OF SYSTEMS:   GENERAL HEALTH: feels well otherwise  GENERAL : denies fever, chills, sweats, weight loss, weight gain  SKIN: denies any unusual skin lesions or rashes  RESPIRATORY: denies shortness of breath with exertion  NEURO: denies headaches    EXAM:   There were no vitals taken for this visit.    System Findings Details   Constitutional  Overall appearance - Normal.   Psychiatric  Orientation - Oriented to time, place, person & situation. Appropriate mood and affect.   Head/Face  Facial features -- Normal. Skull - Normal.   Eyes  Pupils equal ,round ,react to light and accomidate   Ears, Nose, Throat, Neck  Ears clear nose nasal vestibulitis.  Oropharynx clear neck no masses   Neurological  Memory - Normal. Cranial nerves - Cranial nerves II through XII grossly intact.   Lymph Detail  Submental. Submandibular. Anterior cervical. Posterior cervical. Supraclavicular.       ASSESSMENT AND PLAN:   1. Epistaxis  I believe nasal vestibulitis is contributing.    2. Nasal vestibulitis  He will use saline and Bactroban ointment.  If symptoms persist he will see me back for cautery.      The patient indicates understanding of these issues and agrees to the plan.    No follow-ups on file.    Doron José MD  3/27/2024  11:39 AM

## 2024-03-27 NOTE — PATIENT INSTRUCTIONS
Use Ayr saline nasal spray, 5 sprays each side of nose 3x per day.  Put mupirocin ointment in nose after the saline.  Do this for 10 days.  See me back if nose bleeds or pain continue.

## 2024-03-28 ENCOUNTER — OFFICE VISIT (OUTPATIENT)
Dept: PHYSICAL THERAPY | Age: 64
End: 2024-03-28
Attending: SURGERY
Payer: COMMERCIAL

## 2024-03-28 DIAGNOSIS — Z87.19 STATUS POST INGUINAL HERNIA REPAIR: Primary | ICD-10-CM

## 2024-03-28 DIAGNOSIS — Z98.890 STATUS POST INGUINAL HERNIA REPAIR: Primary | ICD-10-CM

## 2024-03-28 PROCEDURE — 97530 THERAPEUTIC ACTIVITIES: CPT

## 2024-03-28 PROCEDURE — 97161 PT EVAL LOW COMPLEX 20 MIN: CPT

## 2024-03-28 NOTE — PROGRESS NOTES
MUSCULOSKELETAL AND PELVIC FLOOR EVALUATION:     Diagnosis:   Status post inguinal hernia repair      Right inguinL pain  Abdominal pain      Referring Provider: Darwin Stratton  Date of Evaluation:    3/28/2024    Precautions:  None Next MD visit:   none scheduled  Date of Surgery: n/a     PATIENT SUMMARY   Yanick Rust is a 63 year old male  who presents to therapy today with complaints of RLQ abdominal pain after the hernia surgery and his pain has still been there for 5 months now  specially when bending forward.He however observed that in the last 3-4 days the pain has been better,he states the pain can be daily but only with certain position , sometimes moving on that certain position  does not cause the pain,he said he  couldnt explain well.    Current symptoms include: abdominal pain, urgency/frequency, and incomplete emptyingof urine      Functional activities that is affected can be changing clothes ,ADLS or work activities that entails bending      Pt describes pain level: current 0/10, best 0/10, worst 6/10.   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  Past medical history was reviewed.    URINARY HABITS  Types of symptoms: urge incontinence and incomplete emptying  Events associated with the onset of urinary complaints: post hernia surgery  Urinary Frequency: yes  Leaking occurs: N/A  Episodes of Leakage: N/A  Pad use: N/A  Pad Change frequency: N/A  Nocturia: no  Fluid Intake: will assess  Bladder irritants: coffee  Post void dribble: yes  Empty bladder just in case: yes  Do you ever leak urine without knowing it? N/A    BOWEL HABITS  Types of symptoms: No complaints ( regular and no straining per patient)   Frequency of bowel movements: daily  Stool consistency: [place] Stool Scale: 4  Do you strain with defecation: No   Laxative use:  No      ASSESSMENT  Yanick presents to physical therapy evaluation with primary c/o RLQ abdominal pain , increased frequency and urgency of urination since his inguinal hernia repair.. The results of the objective tests and measures show moderate tenderness to palpation or RLQ, incoordinated contraction  with diaphragm and abdominals during bivities can be painful . Pt and PT discussed evaluation findings, pathology, POC and HEP.  Pt voiced understanding and performs HEP correctly without reported pain. Skilled Pelvic Physical Therapy is medically necessary to address the above impairments and reach functional goals.    OBJECTIVE:   Posture: No significant deviation  Gait: pt ambulates on level ground with normal mechanics.   Breathing assessment: decreased rib excursion with incoordinated breathing and abdominal contraction    External Palpation: Abdominals: Mod tenderness on certain spot on RLQ  Scars (location/surgery): small scars from lipomas and the hernia surgery  Abdominal Wall Integrity: WNL    Range Of Motion  Lumbar AROM screen: WNL on all planes  LE AROM screen: grossly WNL     Strength (MMT) 5/5 ASHLEY LE   Core strength/stability : Fair    Flexibility Summary: Minimal limitation on bilateral hip/knee bilaterally      Today's Treatment and Response:   Patient education was provided on objective findings of external and internal evaluation and expectations with treatment outcomes. Educated on bladder normatives, stress/urge urinary incontinence strategies, diaphragmatic breathing for PNS activation and pelvic floor relaxation , and coordination of diaphragmatic breathing and pelvic floor contraction     Patient was instructed in and issued a HEP for: Diaphragmatic breathing techniques, abdominal massage and stretches    Charges: PT Eval Low Complexity, Thera Act x 8      Total Timed Treatment: 8 min     Total Treatment Time: 40 min     Based on clinical rationale and outcome measures, this evaluation involved  Low Complexity decision making due to 1-2 personal factors/comorbidities, 3 body structures involved/activity limitations, and evolving symptoms including urge urinary incontinence and abdominal pain  PLAN OF CARE:    Goals: (to be met in 6 visits)  1.Pt education on PT role therapy , management, therapy goals and demonstrates good compliance home exercisess program.  2.Pt to report no pain of tenderness to palpation or pressure on abdominal RLQ   3.Pt to report able to do  forward bending doing ADL's and/or work activities without pain for >1 week   3.Pt to report improvement with urinary symptoms with more normalized frequency of urination between 2-4 hours.    Frequency / Duration: Patient will be seen for 1-2 x/week or a total of 6 visits over a 90 day period.  Treatment will include: Manual Therapy, Neuromuscular Re-education, Therapeutic Activities, Therapeutic Exercise, and Home Exercise Program instruction     Education or treatment limitation: None  Rehab Potential:good      Patient/Family/Caregiver was advised of these findings, precautions, and treatment options and has agreed to actively participate in planning and for this course of care.    Thank you for your referral. Please co-sign or sign and return this letter via fax as soon as possible to [phone]. If you have any questions, please contact me at Dept: [phone]    Sincerely,  Electronically signed by therapist: Jared Shelton PT  Physician's certification required: Yes  I certify the need for these services furnished under this plan of treatment and while under my care.    X___________________________________________________ Date____________________    Certification From: 3/28/2024  To:6/26/2024

## 2024-04-02 ENCOUNTER — OFFICE VISIT (OUTPATIENT)
Dept: PHYSICAL THERAPY | Age: 64
End: 2024-04-02
Attending: SURGERY
Payer: COMMERCIAL

## 2024-04-02 DIAGNOSIS — Z87.19 STATUS POST INGUINAL HERNIA REPAIR: Primary | ICD-10-CM

## 2024-04-02 DIAGNOSIS — Z98.890 STATUS POST INGUINAL HERNIA REPAIR: Primary | ICD-10-CM

## 2024-04-02 PROCEDURE — 97140 MANUAL THERAPY 1/> REGIONS: CPT

## 2024-04-02 PROCEDURE — 97110 THERAPEUTIC EXERCISES: CPT

## 2024-04-02 NOTE — PROGRESS NOTES
Diagnosis:   Status post inguinal hernia repair      Right inguinL pain  Abdominal pain        Referring Provider: Darwin Stratton  Date of Evaluation:     3/28/2024    Precautions:  None Next MD visit:   none scheduled  Date of Surgery: n/a   Insurance Primary/Secondary: [employer] [place] HMO / N/A     # Auth Visits: 12 Auth(Exp 5/31/24)            Subjective: Pt reports after the first visit he was hurting in the area (abdominal RLQ ) that could be due to poking on it at evaluation , he also states he had pain yesterday and he was not at work so he does not know when it comes.    Pain: 3-4/10 abdominal pain when present      Objective: Abdominal RLQ Mod tenderness to more deeper palpation    CRAD-8: 3.13    MELANIA-6 : 4.17    Assessment: Noted at least 2 lipoma like nodules close to the painful are on RLQ, also noted  air pockets producing audible sounds and painful to palpation on R LQ but the forward bending movements that used to cause him pain did not hurt during the session today.      Goals: (to be met in 6 visits)  1.Pt education on PT role therapy , management, therapy goals and demonstrates good compliance home exercisess program.  2.Pt to report no pain of tenderness to palpation or pressure on abdominal RLQ   3.Pt to report able to do  forward bending doing ADL's and/or work activities without pain for >1 week   3.Pt to report improvement with urinary symptoms with more normalized frequency of urination between 2-4 hours.    Plan: Continue with plan of care.  Date: 4/2/2024  TX#: 2/6 Date:                 TX#: 3/ Date:                 TX#: 4/ Date:                 TX#: 5/ Date:   Tx#: 6/   Thera Ex:20'       TM@1.0 mph x5 min to warm up LE muscles  Fwd lunges with anterior chain movements x 5 R/L  Post chain movements R/L x 5 each  SB on wall thoracolumbar extensions x10  Lat flexion R/L x 10  Rotations  R/L x10  Fwd bending/lumbar flexion x10  Review/return demo of diaphragmatic breathing       Manual Tx :23'        STM /MFR, scar mobilization on abdominals mostly on R lower quadrant       HEP: Diaphragmatic breathing, hip flexor stretches B, thoracolumbar stretch.    Charges: Thera Ex x 1, Manual Tx x2       Total Timed Treatment: 43 min  Total Treatment Time: 43 min        MUSCULOSKELETAL AND PELVIC FLOOR EVALUATION:     Diagnosis:   Status post inguinal hernia repair      Right inguinL pain  Abdominal pain      Referring Provider: Darwin Stratton  Date of Evaluation:    3/28/2024    Precautions:  None Next MD visit:   none scheduled  Date of Surgery: n/a     PATIENT SUMMARY   Yanick Rust is a 63 year old male  who presents to therapy today with complaints of RLQ abdominal pain after the hernia surgery and his pain has still been there for 5 months now  specially when bending forward.He however observed that in the last 3-4 days the pain has been better,he states the pain can be daily but only with certain position , sometimes moving on that certain position  does not cause the pain,he said he  couldnt explain well.    Current symptoms include: abdominal pain, urgency/frequency, and incomplete emptyingof urine      Functional activities that is affected can be changing clothes ,ADLS or work activities that entails bending      Pt describes pain level: current 0/10, best 0/10, worst 6/10.   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  Past medical history was reviewed.    URINARY HABITS  Types of symptoms: urge incontinence and incomplete emptying  Events associated with the onset of urinary complaints: post hernia surgery  Urinary Frequency: yes  Leaking occurs: N/A  Episodes of Leakage: N/A  Pad use: N/A  Pad Change frequency: N/A  Nocturia: no  Fluid Intake: will assess  Bladder irritants: coffee  Post void dribble: yes  Empty bladder just in case: yes  Do you  ever leak urine without knowing it? N/A    BOWEL HABITS  Types of symptoms: No complaints ( regular and no straining per patient)   Frequency of bowel movements: daily  Stool consistency: [place] Stool Scale: 4  Do you strain with defecation: No   Laxative use: No      ASSESSMENT  Yanick presents to physical therapy evaluation with primary c/o RLQ abdominal pain , increased frequency and urgency of urination since his inguinal hernia repair.. The results of the objective tests and measures show moderate tenderness to palpation or RLQ, incoordinated contraction  with diaphragm and abdominals during bivities can be painful . Pt and PT discussed evaluation findings, pathology, POC and HEP.  Pt voiced understanding and performs HEP correctly without reported pain. Skilled Pelvic Physical Therapy is medically necessary to address the above impairments and reach functional goals.    OBJECTIVE:   Posture: No significant deviation  Gait: pt ambulates on level ground with normal mechanics.   Breathing assessment: decreased rib excursion with incoordinated breathing and abdominal contraction    External Palpation: Abdominals: Mod tenderness on certain spot on RLQ  Scars (location/surgery): small scars from lipomas and the hernia surgery  Abdominal Wall Integrity: WNL    Range Of Motion  Lumbar AROM screen: WNL on all planes  LE AROM screen: grossly WNL     Strength (MMT) 5/5 ASHLEY LE   Core strength/stability : Fair    Flexibility Summary: Minimal limitation on bilateral hip/knee bilaterally      Today's Treatment and Response:   Patient education was provided on objective findings of external and internal evaluation and expectations with treatment outcomes. Educated on bladder normatives, stress/urge urinary incontinence strategies, diaphragmatic breathing for PNS activation and pelvic floor relaxation , and coordination of diaphragmatic breathing and pelvic floor contraction     Patient was instructed in and issued a HEP for:  Diaphragmatic breathing techniques, abdominal massage and stretches    Charges: PT Marlo Low Complexity, Thera Act x 8      Total Timed Treatment: 8 min     Total Treatment Time: 40 min     Based on clinical rationale and outcome measures, this evaluation involved Low Complexity decision making due to 1-2 personal factors/comorbidities, 3 body structures involved/activity limitations, and evolving symptoms including urge urinary incontinence and abdominal pain  PLAN OF CARE:    Goals: (to be met in 6 visits)  1.Pt education on PT role therapy , management, therapy goals and demonstrates good compliance home exercisess program.  2.Pt to report no pain of tenderness to palpation or pressure on abdominal RLQ   3.Pt to report able to do  forward bending doing ADL's and/or work activities without pain for >1 week   3.Pt to report improvement with urinary symptoms with more normalized frequency of urination between 2-4 hours.    Frequency / Duration: Patient will be seen for 1-2 x/week or a total of 6 visits over a 90 day period.  Treatment will include: Manual Therapy, Neuromuscular Re-education, Therapeutic Activities, Therapeutic Exercise, and Home Exercise Program instruction     Education or treatment limitation: None  Rehab Potential:good      Patient/Family/Caregiver was advised of these findings, precautions, and treatment options and has agreed to actively participate in planning and for this course of care.    Thank you for your referral. Please co-sign or sign and return this letter via fax as soon as possible to [phone]. If you have any questions, please contact me at Dept: [phone]    Sincerely,  Electronically signed by therapist: Jared Shelton PT  Physician's certification required: Yes  I certify the need for these services furnished under this plan of treatment and while under my care.    X___________________________________________________ Date____________________    Certification From: 3/28/2024   To:6/26/2024

## 2024-04-04 ENCOUNTER — MED REC SCAN ONLY (OUTPATIENT)
Dept: FAMILY MEDICINE CLINIC | Facility: CLINIC | Age: 64
End: 2024-04-04

## 2024-04-08 ENCOUNTER — OFFICE VISIT (OUTPATIENT)
Dept: PHYSICAL THERAPY | Age: 64
End: 2024-04-08
Attending: SURGERY
Payer: COMMERCIAL

## 2024-04-08 DIAGNOSIS — Z98.890 STATUS POST INGUINAL HERNIA REPAIR: Primary | ICD-10-CM

## 2024-04-08 DIAGNOSIS — Z87.19 STATUS POST INGUINAL HERNIA REPAIR: Primary | ICD-10-CM

## 2024-04-08 PROCEDURE — 97110 THERAPEUTIC EXERCISES: CPT

## 2024-04-08 NOTE — PROGRESS NOTES
Diagnosis:   Status post inguinal hernia repair      Right inguinL pain  Abdominal pain        Referring Provider: Darwin Stratton  Date of Evaluation:     3/28/2024    Precautions:  None Next MD visit:   none scheduled  Date of Surgery: n/a   Insurance Primary/Secondary: [employer] [place] / N/A     # Auth Visits: 12 Auth(Exp 5/31/24)            Subjective: Pt was concern if it is normal to have more pain later after therapy till for couple of days.  Pain: 4/10 abdominal pain when present      Objective:     CRAD-8: 3.13    MELANIA-6 : 4.17    Assessment:We are holding of on manual treatment for today and observe how his  pain for the week. We did a lot of stretches specially on hip flexors , cuing on proper breathing during core stabilization exercises.He mentions some discomfort on abominal RLQ with certain exercises but not pain.      Goals: (to be met in 6 visits)  1.Pt education on PT role therapy , management, therapy goals and demonstrates good compliance home exercisess program.  2.Pt to report no pain of tenderness to palpation or pressure on abdominal RLQ   3.Pt to report able to do  forward bending doing ADL's and/or work activities without pain for >1 week   3.Pt to report improvement with urinary symptoms with more normalized frequency of urination between 2-4 hours.    Plan: Continue with plan of care focusing on flexibility ,thoracolumbar mobility and core muscles stabilization.Will try thoracolumbar junction mobs next visit.  Date: 4/2/2024  TX#: 2/6 Date:4/8/2024              TX#: 3/6 Date:                 TX#: 4/ Date:                 TX#: 5/ Date:   Tx#: 6/   Thera Ex:20' Thera Ex:45'      TM@1.0 mph x5 min to warm up LE muscles  Fwd lunges with anterior chain movements x 5 R/L  Post chain movements R/L x 5 each  SB on wall thoracolumbar extensions x10  Lat flexion R/L x 10  Rotations  R/L x10  Fwd bending/lumbar flexion x10  Review/return demo of diaphragmatic breathing Elliptical 4'/4'  Functional  chain reac  Ant,post chain/SFT planes x10 each  B on wall thoracolumbar extensions x10  Lat flexion R/L x 10  Rotations  R/L x10  Squats with arms forward x 20  Mod lucina stretch on mat table with PT over pressure 30\"x3B  Quads and ITB stretch   Prone hip flexor,quad stretch passive 20-30 sec x 3 BLE  Prone press up 2x10  S/L open book x10   Side plank 20 sec x3        Manual Tx :23'       STM /MFR, scar mobilization on abdominals mostly on R lower quadrant       HEP: Diaphragmatic breathing, hip flexor stretches B, thoracolumbar stretch.    Charges: Thera Ex x 1, Manual Tx x2       Total Timed Treatment: 43 min  Total Treatment Time: 43 min        MUSCULOSKELETAL AND PELVIC FLOOR EVALUATION:     Diagnosis:   Status post inguinal hernia repair      Right inguinL pain  Abdominal pain      Referring Provider: Darwin Stratton  Date of Evaluation:    3/28/2024    Precautions:  None Next MD visit:   none scheduled  Date of Surgery: n/a     PATIENT SUMMARY   Yanick Rust is a 63 year old male  who presents to therapy today with complaints of RLQ abdominal pain after the hernia surgery and his pain has still been there for 5 months now  specially when bending forward.He however observed that in the last 3-4 days the pain has been better,he states the pain can be daily but only with certain position , sometimes moving on that certain position  does not cause the pain,he said he  couldnt explain well.    Current symptoms include: abdominal pain, urgency/frequency, and incomplete emptyingof urine      Functional activities that is affected can be changing clothes ,ADLS or work activities that entails bending      Pt describes pain level: current 0/10, best 0/10, worst 6/10.   Quality: intermittent, dull, and other: could be burning sometimes    Occupation/Activities:  of [employer]     Pt goals include to have no pain or tenderness on his abdomen.  Past medical history was reviewed.    URINARY  HABITS  Types of symptoms: urge incontinence and incomplete emptying  Events associated with the onset of urinary complaints: post hernia surgery  Urinary Frequency: yes  Leaking occurs: N/A  Episodes of Leakage: N/A  Pad use: N/A  Pad Change frequency: N/A  Nocturia: no  Fluid Intake: will assess  Bladder irritants: coffee  Post void dribble: yes  Empty bladder just in case: yes  Do you ever leak urine without knowing it? N/A    BOWEL HABITS  Types of symptoms: No complaints ( regular and no straining per patient)   Frequency of bowel movements: daily  Stool consistency: [place] Stool Scale: 4  Do you strain with defecation: No   Laxative use: No      ASSESSMENT  Yanick presents to physical therapy evaluation with primary c/o RLQ abdominal pain , increased frequency and urgency of urination since his inguinal hernia repair.. The results of the objective tests and measures show moderate tenderness to palpation or RLQ, incoordinated contraction  with diaphragm and abdominals during bivities can be painful . Pt and PT discussed evaluation findings, pathology, POC and HEP.  Pt voiced understanding and performs HEP correctly without reported pain. Skilled Pelvic Physical Therapy is medically necessary to address the above impairments and reach functional goals.    OBJECTIVE:   Posture: No significant deviation  Gait: pt ambulates on level ground with normal mechanics.   Breathing assessment: decreased rib excursion with incoordinated breathing and abdominal contraction    External Palpation: Abdominals: Mod tenderness on certain spot on RLQ  Scars (location/surgery): small scars from lipomas and the hernia surgery  Abdominal Wall Integrity: WNL    Range Of Motion  Lumbar AROM screen: WNL on all planes  LE AROM screen: grossly WNL     Strength (MMT) 5/5 ASHLEY LE   Core strength/stability : Fair    Flexibility Summary: Minimal limitation on bilateral hip/knee bilaterally      Today's Treatment and Response:   Patient  education was provided on objective findings of external and internal evaluation and expectations with treatment outcomes. Educated on bladder normatives, stress/urge urinary incontinence strategies, diaphragmatic breathing for PNS activation and pelvic floor relaxation , and coordination of diaphragmatic breathing and pelvic floor contraction     Patient was instructed in and issued a HEP for: Diaphragmatic breathing techniques, abdominal massage and stretches    Charges: PT Eval Low Complexity, Thera Act x 8      Total Timed Treatment: 8 min     Total Treatment Time: 40 min     Based on clinical rationale and outcome measures, this evaluation involved Low Complexity decision making due to 1-2 personal factors/comorbidities, 3 body structures involved/activity limitations, and evolving symptoms including urge urinary incontinence and abdominal pain  PLAN OF CARE:    Goals: (to be met in 6 visits)  1.Pt education on PT role therapy , management, therapy goals and demonstrates good compliance home exercisess program.  2.Pt to report no pain of tenderness to palpation or pressure on abdominal RLQ   3.Pt to report able to do  forward bending doing ADL's and/or work activities without pain for >1 week   3.Pt to report improvement with urinary symptoms with more normalized frequency of urination between 2-4 hours.    Frequency / Duration: Patient will be seen for 1-2 x/week or a total of 6 visits over a 90 day period.  Treatment will include: Manual Therapy, Neuromuscular Re-education, Therapeutic Activities, Therapeutic Exercise, and Home Exercise Program instruction     Education or treatment limitation: None  Rehab Potential:good      Patient/Family/Caregiver was advised of these findings, precautions, and treatment options and has agreed to actively participate in planning and for this course of care.    Thank you for your referral. Please co-sign or sign and return this letter via fax as soon as possible to  [phone]. If you have any questions, please contact me at Dept: [phone]    Sincerely,  Electronically signed by therapist: Jared Shelton PT  Physician's certification required: Yes  I certify the need for these services furnished under this plan of treatment and while under my care.    X___________________________________________________ Date____________________    Certification From: 3/28/2024  To:6/26/2024

## 2024-04-11 ENCOUNTER — OFFICE VISIT (OUTPATIENT)
Dept: PHYSICAL THERAPY | Age: 64
End: 2024-04-11
Attending: SURGERY
Payer: COMMERCIAL

## 2024-04-11 DIAGNOSIS — Z98.890 STATUS POST INGUINAL HERNIA REPAIR: Primary | ICD-10-CM

## 2024-04-11 DIAGNOSIS — Z87.19 STATUS POST INGUINAL HERNIA REPAIR: Primary | ICD-10-CM

## 2024-04-11 PROCEDURE — 97110 THERAPEUTIC EXERCISES: CPT

## 2024-04-11 NOTE — PROGRESS NOTES
Diagnosis:   Status post inguinal hernia repair      Right inguinL pain  Abdominal pain        Referring Provider: Darwin Stratton  Date of Evaluation:     3/28/2024    Precautions:  None Next MD visit:   none scheduled  Date of Surgery: n/a   Insurance Primary/Secondary: [employer] [place] / N/A     # Auth Visits: 12 Auth(Exp 5/31/24)            Subjective: Pt reports he has min or not significant pain or discomfort since the last visit even when he is bending forward  Pain: 0/10 abdominal pain      Objective:     CRAD-8: 3.13    MELANIA-6 : 4.17    Assessment: Pt report some pressure and light discomfort on R LQ when doing hip flexor joshua with SB push down but denies any pain throughout the session.He was given cuing for proper and good coordinated breathing during exercises.    Goals: (to be met in 6 visits)  1.Pt education on PT role therapy , management, therapy goals and demonstrates good compliance home exercisess program.  2.Pt to report no pain of tenderness to palpation or pressure on abdominal RLQ   3.Pt to report able to do  forward bending doing ADL's and/or work activities without pain for >1 week   3.Pt to report improvement with urinary symptoms with more normalized frequency of urination between 2-4 hours.    Plan: Continue with plan of care focusing on flexibility ,thoracolumbar mobility and core muscles stabilization.Will try thoracolumbar junction mobs next visit.  Date: 4/2/2024  TX#: 2/6 Date:4/8/2024              TX#: 3/6 Date:4/11/2024                 TX#: 4/ Date:                 TX#: 5/ Date:   Tx#: 6/   Thera Ex:20' Thera Ex:45' Thera Ex:43'     TM@1.0 mph x5 min to warm up LE muscles  Fwd lunges with anterior chain movements x 5 R/L  Post chain movements R/L x 5 each  SB on wall thoracolumbar extensions x10  Lat flexion R/L x 10  Rotations  R/L x10  Fwd bending/lumbar flexion x10  Review/return demo of diaphragmatic breathing Elliptical 4'/4'  Functional chain reac  Ant,post chain/SFT planes x10  each  SB on wall thoracolumbar extensions x10  Lat flexion R/L x 10  Rotations  R/L x10  Squats with arms forward x 20  Mod lucina stretch on mat table with PT over pressure 30\"x3B  Quads and ITB stretch   Prone hip flexor,quad stretch passive 20-30 sec x 3 BLE  Prone press up 2x10  S/L open book x10   Side plank 20 sec x3   Elliptical 6'  Standing FCR fwd lunges and lateral lunges 30\" x 2 each  SB on wall Thoracolumbar extensions x10  Seated with 1/2 FR thoracic ext , lateral flexion and rotation x10  Prone press up with hip flexor stretch 10\"x10 R/L  Mod side plank(on knees) 30 sec x3 B  H/L SB push down x1'  Added R/Lhip flex hold 1'  LTR 10\" hold R/L x1'  LE over SB bridges 3x10  DKTC with #4 BUE 3x10  Standing SB on table fwd rolling 1'           Manual Tx :23'       STM /MFR, scar mobilization on abdominals mostly on R lower quadrant       HEP: Diaphragmatic breathing, hip flexor stretches B, thoracolumbar stretch.    Charges: Thera Ex x 3     Total Timed Treatment: 43 min  Total Treatment Time: 43 min        MUSCULOSKELETAL AND PELVIC FLOOR EVALUATION:     Diagnosis:   Status post inguinal hernia repair      Right inguinL pain  Abdominal pain      Referring Provider: Darwin Stratton  Date of Evaluation:    3/28/2024    Precautions:  None Next MD visit:   none scheduled  Date of Surgery: n/a     PATIENT SUMMARY   Yanick Rust is a 63 year old male  who presents to therapy today with complaints of RLQ abdominal pain after the hernia surgery and his pain has still been there for 5 months now  specially when bending forward.He however observed that in the last 3-4 days the pain has been better,he states the pain can be daily but only with certain position , sometimes moving on that certain position  does not cause the pain,he said he  couldnt explain well.    Current symptoms include: abdominal pain, urgency/frequency, and incomplete emptyingof urine      Functional activities that is affected can be changing  clothes ,ADLS or work activities that entails bending      Pt describes pain level: current 0/10, best 0/10, worst 6/10.   Quality: intermittent, dull, and other: could be burning sometimes    Occupation/Activities:  of [employer]     Pt goals include to have no pain or tenderness on his abdomen.  Past medical history was reviewed.    URINARY HABITS  Types of symptoms: urge incontinence and incomplete emptying  Events associated with the onset of urinary complaints: post hernia surgery  Urinary Frequency: yes  Leaking occurs: N/A  Episodes of Leakage: N/A  Pad use: N/A  Pad Change frequency: N/A  Nocturia: no  Fluid Intake: will assess  Bladder irritants: coffee  Post void dribble: yes  Empty bladder just in case: yes  Do you ever leak urine without knowing it? N/A    BOWEL HABITS  Types of symptoms: No complaints ( regular and no straining per patient)   Frequency of bowel movements: daily  Stool consistency: [place] Stool Scale: 4  Do you strain with defecation: No   Laxative use: No      ASSESSMENT  Yanick presents to physical therapy evaluation with primary c/o RLQ abdominal pain , increased frequency and urgency of urination since his inguinal hernia repair.. The results of the objective tests and measures show moderate tenderness to palpation or RLQ, incoordinated contraction  with diaphragm and abdominals during bivities can be painful . Pt and PT discussed evaluation findings, pathology, POC and HEP.  Pt voiced understanding and performs HEP correctly without reported pain. Skilled Pelvic Physical Therapy is medically necessary to address the above impairments and reach functional goals.    OBJECTIVE:   Posture: No significant deviation  Gait: pt ambulates on level ground with normal mechanics.   Breathing assessment: decreased rib excursion with incoordinated breathing and abdominal contraction    External Palpation: Abdominals: Mod tenderness on certain spot on RLQ  Scars  (location/surgery): small scars from lipomas and the hernia surgery  Abdominal Wall Integrity: WNL    Range Of Motion  Lumbar AROM screen: WNL on all planes  LE AROM screen: grossly WNL     Strength (MMT) 5/5 ASHLEY LE   Core strength/stability : Fair    Flexibility Summary: Minimal limitation on bilateral hip/knee bilaterally      Today's Treatment and Response:   Patient education was provided on objective findings of external and internal evaluation and expectations with treatment outcomes. Educated on bladder normatives, stress/urge urinary incontinence strategies, diaphragmatic breathing for PNS activation and pelvic floor relaxation , and coordination of diaphragmatic breathing and pelvic floor contraction     Patient was instructed in and issued a HEP for: Diaphragmatic breathing techniques, abdominal massage and stretches    Charges: PT Eval Low Complexity, Thera Act x 8      Total Timed Treatment: 8 min     Total Treatment Time: 40 min     Based on clinical rationale and outcome measures, this evaluation involved Low Complexity decision making due to 1-2 personal factors/comorbidities, 3 body structures involved/activity limitations, and evolving symptoms including urge urinary incontinence and abdominal pain  PLAN OF CARE:    Goals: (to be met in 6 visits)  1.Pt education on PT role therapy , management, therapy goals and demonstrates good compliance home exercisess program.  2.Pt to report no pain of tenderness to palpation or pressure on abdominal RLQ   3.Pt to report able to do  forward bending doing ADL's and/or work activities without pain for >1 week   3.Pt to report improvement with urinary symptoms with more normalized frequency of urination between 2-4 hours.    Frequency / Duration: Patient will be seen for 1-2 x/week or a total of 6 visits over a 90 day period.  Treatment will include: Manual Therapy, Neuromuscular Re-education, Therapeutic Activities, Therapeutic Exercise, and Home Exercise  Program instruction     Education or treatment limitation: None  Rehab Potential:good      Patient/Family/Caregiver was advised of these findings, precautions, and treatment options and has agreed to actively participate in planning and for this course of care.    Thank you for your referral. Please co-sign or sign and return this letter via fax as soon as possible to [phone]. If you have any questions, please contact me at Dept: [phone]    Sincerely,  Electronically signed by therapist: Jared Shelton PT  Physician's certification required: Yes  I certify the need for these services furnished under this plan of treatment and while under my care.    X___________________________________________________ Date____________________    Certification From: 3/28/2024  To:6/26/2024

## 2024-04-15 ENCOUNTER — OFFICE VISIT (OUTPATIENT)
Dept: PHYSICAL THERAPY | Age: 64
End: 2024-04-15
Attending: SURGERY
Payer: COMMERCIAL

## 2024-04-15 DIAGNOSIS — Z98.890 STATUS POST INGUINAL HERNIA REPAIR: Primary | ICD-10-CM

## 2024-04-15 DIAGNOSIS — Z87.19 STATUS POST INGUINAL HERNIA REPAIR: Primary | ICD-10-CM

## 2024-04-15 PROCEDURE — 97110 THERAPEUTIC EXERCISES: CPT

## 2024-04-15 NOTE — PROGRESS NOTES
Diagnosis:   Status post inguinal hernia repair      Right inguinL pain  Abdominal pain        Referring Provider: Darwin Stratton  Date of Evaluation:     3/28/2024    Precautions:  None Next MD visit:   none scheduled  Date of Surgery: n/a   Insurance Primary/Secondary: [employer] [place] / N/A     # Auth Visits: 12 Auth(Exp 5/31/24)           Discharge Summary  Pt has attended 5 visits in Physical Therapy.     Subjective: Pt reports he has been doing some home project over the weekend and does not really have abdominal pain RLQ   Pain: 0/10 abdominal pain      Objective:   Palpation : min to no significant TPP on RLQ  Forward flexion movement does not cause the pain  Improved lumbopelvic/LE flexibility noted.      Assessment: Pt reports he has been feeling good and agreeable to continue with home exercises ,he will just follow up with the doctor as needed in the future.      Goals: (to be met in 6 visits)  1.Pt education on PT role therapy , management, therapy goals and demonstrates good compliance home exercisess program.,MET  2.Pt to report no pain of tenderness to palpation or pressure on abdominal RLQ ,MET  3.Pt to report able to do  forward bending doing ADL's and/or work activities without pain for >1 week ,MET  3.Pt to report improvement with urinary symptoms with more normalized frequency of urination between 2-4 hours.Improved    Plan: Discharge with HEP.  Date: 4/2/2024  TX#: 2/6 Date:4/8/2024              TX#: 3/6 Date:4/11/2024                 TX#: 4/6 Date: 4/15/2024               TX#: 5/6 Date:   Tx#: 6/   Thera Ex:20' Thera Ex:45' Thera Ex:43' Thera Ex:40'    TM@1.0 mph x5 min to warm up LE muscles  Fwd lunges with anterior chain movements x 5 R/L  Post chain movements R/L x 5 each  SB on wall thoracolumbar extensions x10  Lat flexion R/L x 10  Rotations  R/L x10  Fwd bending/lumbar flexion x10  Review/return demo of diaphragmatic breathing Elliptical 4'/4'  Functional chain reac  Ant,post chain/SFT  planes x10 each  SB on wall thoracolumbar extensions x10  Lat flexion R/L x 10  Rotations  R/L x10  Squats with arms forward x 20  Mod lucina stretch on mat table with PT over pressure 30\"x3B  Quads and ITB stretch   Prone hip flexor,quad stretch passive 20-30 sec x 3 BLE  Prone press up 2x10  S/L open book x10   Side plank 20 sec x3   Elliptical 6'  Standing FCR fwd lunges and lateral lunges 30\" x 2 each  SB on wall Thoracolumbar extensions x10  Seated with 1/2 FR thoracic ext , lateral flexion and rotation x10  Prone press up with hip flexor stretch 10\"x10 R/L  Mod side plank(on knees) 30 sec x3 B  H/L SB push down x1'  Added R/Lhip flex hold 1'  LTR 10\" hold R/L x1'  LE over SB bridges 3x10  DKTC with #4 BUE 3x10  Standing SB on table fwd rolling 1'       Elliptical 6'  SB on wall Thoracolumbar extensions x10  Side bending stretch  Seated with 1/2 FR thoracic ext , lateral flexion and rotation x10  Freemotion #10 laith to low chops x20 R/L  Mod lucina stretch   30\"x3 BLE  Open book x10 R/L  Prone press up x10  Review/return demo of HEP>      Manual Tx :23'       STM /MFR, scar mobilization on abdominals mostly on R lower quadrant       HEP: Diaphragmatic breathing, hip flexor stretches B, thoracolumbar stretch., hip flexor stretches, LTR,open book, thoracic spine self mobs    Charges: Thera Ex x 3     Total Timed Treatment: 40 min  Total Treatment Time: 40min        MUSCULOSKELETAL AND PELVIC FLOOR EVALUATION:     Diagnosis:   Status post inguinal hernia repair      Right inguinL pain  Abdominal pain      Referring Provider: Darwin Stratton  Date of Evaluation:    3/28/2024    Precautions:  None Next MD visit:   none scheduled  Date of Surgery: n/a     PATIENT SUMMARY   Yanick Rust is a 63 year old male  who presents to therapy today with complaints of RLQ abdominal pain after the hernia surgery and his pain has still been there for 5 months now  specially when bending forward.He however observed that in the  last 3-4 days the pain has been better,he states the pain can be daily but only with certain position , sometimes moving on that certain position  does not cause the pain,he said he  couldnt explain well.    Current symptoms include: abdominal pain, urgency/frequency, and incomplete emptyingof urine      Functional activities that is affected can be changing clothes ,ADLS or work activities that entails bending      Pt describes pain level: current 0/10, best 0/10, worst 6/10.   Quality: intermittent, dull, and other: could be burning sometimes    Occupation/Activities:  of [employer]     Pt goals include to have no pain or tenderness on his abdomen.  Past medical history was reviewed.    URINARY HABITS  Types of symptoms: urge incontinence and incomplete emptying  Events associated with the onset of urinary complaints: post hernia surgery  Urinary Frequency: yes  Leaking occurs: N/A  Episodes of Leakage: N/A  Pad use: N/A  Pad Change frequency: N/A  Nocturia: no  Fluid Intake: will assess  Bladder irritants: coffee  Post void dribble: yes  Empty bladder just in case: yes  Do you ever leak urine without knowing it? N/A    BOWEL HABITS  Types of symptoms: No complaints ( regular and no straining per patient)   Frequency of bowel movements: daily  Stool consistency: [place] Stool Scale: 4  Do you strain with defecation: No   Laxative use: No      ASSESSMENT  Yanick presents to physical therapy evaluation with primary c/o RLQ abdominal pain , increased frequency and urgency of urination since his inguinal hernia repair.. The results of the objective tests and measures show moderate tenderness to palpation or RLQ, incoordinated contraction  with diaphragm and abdominals during bivities can be painful . Pt and PT discussed evaluation findings, pathology, POC and HEP.  Pt voiced understanding and performs HEP correctly without reported pain. Skilled Pelvic Physical Therapy is medically necessary  to address the above impairments and reach functional goals.    OBJECTIVE:   Posture: No significant deviation  Gait: pt ambulates on level ground with normal mechanics.   Breathing assessment: decreased rib excursion with incoordinated breathing and abdominal contraction    External Palpation: Abdominals: Mod tenderness on certain spot on RLQ  Scars (location/surgery): small scars from lipomas and the hernia surgery  Abdominal Wall Integrity: WNL    Range Of Motion  Lumbar AROM screen: WNL on all planes  LE AROM screen: grossly WNL     Strength (MMT) 5/5 ASHLEY LE   Core strength/stability : Fair    Flexibility Summary: Minimal limitation on bilateral hip/knee bilaterally      Today's Treatment and Response:   Patient education was provided on objective findings of external and internal evaluation and expectations with treatment outcomes. Educated on bladder normatives, stress/urge urinary incontinence strategies, diaphragmatic breathing for PNS activation and pelvic floor relaxation , and coordination of diaphragmatic breathing and pelvic floor contraction     Patient was instructed in and issued a HEP for: Diaphragmatic breathing techniques, abdominal massage and stretches    Charges: PT Eval Low Complexity, Thera Act x 8      Total Timed Treatment: 8 min     Total Treatment Time: 40 min     Based on clinical rationale and outcome measures, this evaluation involved Low Complexity decision making due to 1-2 personal factors/comorbidities, 3 body structures involved/activity limitations, and evolving symptoms including urge urinary incontinence and abdominal pain  PLAN OF CARE:    Goals: (to be met in 6 visits)  1.Pt education on PT role therapy , management, therapy goals and demonstrates good compliance home exercisess program.  2.Pt to report no pain of tenderness to palpation or pressure on abdominal RLQ   3.Pt to report able to do  forward bending doing ADL's and/or work activities without pain for >1 week   3.Pt  to report improvement with urinary symptoms with more normalized frequency of urination between 2-4 hours.    Frequency / Duration: Patient will be seen for 1-2 x/week or a total of 6 visits over a 90 day period.  Treatment will include: Manual Therapy, Neuromuscular Re-education, Therapeutic Activities, Therapeutic Exercise, and Home Exercise Program instruction     Education or treatment limitation: None  Rehab Potential:good      Patient/Family/Caregiver was advised of these findings, precautions, and treatment options and has agreed to actively participate in planning and for this course of care.    Thank you for your referral. Please co-sign or sign and return this letter via fax as soon as possible to [phone]. If you have any questions, please contact me at Dept: [phone]    Sincerely,  Electronically signed by therapist: Jared Shelton PT  Physician's certification required: Yes  I certify the need for these services furnished under this plan of treatment and while under my care.    X___________________________________________________ Date____________________    Certification From: 3/28/2024  To:6/26/2024

## 2024-04-17 ENCOUNTER — APPOINTMENT (OUTPATIENT)
Dept: PHYSICAL THERAPY | Age: 64
End: 2024-04-17
Attending: SURGERY
Payer: COMMERCIAL

## 2024-04-22 ENCOUNTER — APPOINTMENT (OUTPATIENT)
Dept: PHYSICAL THERAPY | Age: 64
End: 2024-04-22
Attending: SURGERY
Payer: COMMERCIAL

## 2024-04-24 ENCOUNTER — APPOINTMENT (OUTPATIENT)
Dept: PHYSICAL THERAPY | Age: 64
End: 2024-04-24
Attending: SURGERY
Payer: COMMERCIAL

## 2024-05-07 LAB
AMB EXT BILIRUBIN, TOTAL: 0.8 MG/DL
AMB EXT BUN: 13 MG/DL
AMB EXT CALCIUM: 9.6
AMB EXT CHLORIDE: 104
AMB EXT CHOL/HDL RATIO: 3.8
AMB EXT CHOLESTEROL, TOTAL: 208 MG/DL
AMB EXT CMP ALT: 22 U/L
AMB EXT CMP AST: 23 U/L
AMB EXT EGFR NON-AA: 99
AMB EXT GLUCOSE: 88 MG/DL
AMB EXT HDL CHOLESTEROL: 55 MG/DL
AMB EXT LDL CHOLESTEROL, DIRECT: 132 MG/DL
AMB EXT POSTASSIUM: 3.9 MMOL/L
AMB EXT SODIUM: 140 MMOL/L
AMB EXT TOTAL PROTEIN: 6.8
AMB EXT TRIGLYCERIDES: 120 MG/DL

## 2024-05-24 LAB — AMB EXT HGBA1C: 5.8 %

## 2024-07-23 ENCOUNTER — OFFICE VISIT (OUTPATIENT)
Dept: FAMILY MEDICINE CLINIC | Facility: CLINIC | Age: 64
End: 2024-07-23
Payer: COMMERCIAL

## 2024-07-23 VITALS
HEART RATE: 72 BPM | HEIGHT: 70.08 IN | BODY MASS INDEX: 27.52 KG/M2 | RESPIRATION RATE: 16 BRPM | TEMPERATURE: 98 F | DIASTOLIC BLOOD PRESSURE: 62 MMHG | SYSTOLIC BLOOD PRESSURE: 100 MMHG | WEIGHT: 192.19 LBS

## 2024-07-23 DIAGNOSIS — Z12.5 SCREENING FOR PROSTATE CANCER: ICD-10-CM

## 2024-07-23 DIAGNOSIS — Z00.00 ANNUAL PHYSICAL EXAM: Primary | ICD-10-CM

## 2024-07-23 DIAGNOSIS — Z13.89 SCREENING FOR GENITOURINARY CONDITION: ICD-10-CM

## 2024-07-23 DIAGNOSIS — E04.1 THYROID NODULE: ICD-10-CM

## 2024-07-23 DIAGNOSIS — Z00.00 LABORATORY EXAMINATION ORDERED AS PART OF A ROUTINE GENERAL MEDICAL EXAMINATION: ICD-10-CM

## 2024-07-23 DIAGNOSIS — R21 RASH OF FACE: ICD-10-CM

## 2024-07-23 DIAGNOSIS — E78.00 HYPERCHOLESTEREMIA: ICD-10-CM

## 2024-07-23 PROCEDURE — 3074F SYST BP LT 130 MM HG: CPT | Performed by: FAMILY MEDICINE

## 2024-07-23 PROCEDURE — 3008F BODY MASS INDEX DOCD: CPT | Performed by: FAMILY MEDICINE

## 2024-07-23 PROCEDURE — 99213 OFFICE O/P EST LOW 20 MIN: CPT | Performed by: FAMILY MEDICINE

## 2024-07-23 PROCEDURE — 99396 PREV VISIT EST AGE 40-64: CPT | Performed by: FAMILY MEDICINE

## 2024-07-23 PROCEDURE — 3078F DIAST BP <80 MM HG: CPT | Performed by: FAMILY MEDICINE

## 2024-07-23 NOTE — PROGRESS NOTES
Yanick Rust is a 63 year old male who presents for a complete physical exam.   HPI:   Patient had abdominal pain after hernia surgery physical therapy helped pain resolved.  Colonoscopy showed multiple polyps he is due for colonoscopy 3 years from the previous one.  Patient had thyroid nodule ultrasound needs to be rechecked for stability of the nodule patient will have it completed.      Patient has elevated cholesterol levels.  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  Taking aspirin , patient not taking cholesterol medication he understands risk associated with not treated hypercholesterolemia including strokes and heart attacks.  Patient will try to modify diet he eats a lot of eggs we will try to cut down on those.     Does not want to have rectal examination done today for prostate cancer screening he understands risk of missing prostate cancer by not doing the testing.    Patient has rash on the face noticed probably couple months ago after shaving.  It last for couple days but then it goes away.  It is in the naso-labial folds and on the left side of neck.  Skin is red and irritated.  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         Immunization History   Administered Date(s) Administered    Covid-19 Vaccine Moderna 100 mcg/0.5 ml 03/09/2021, 04/10/2021    TDAP 04/29/2013, 09/11/2023   Deferred Date(s) Deferred    FLULAVAL 6 months & older 0.5 ml Prefilled syringe (30437) 10/27/2017     Wt Readings from Last 6 Encounters:   07/23/24 192 lb 3.2 oz (87.2 kg)   02/26/24 197 lb (89.4 kg)   10/26/23 189 lb (85.7 kg)   09/11/23 192 lb (87.1 kg)   07/10/23 195 lb (88.5 kg)   05/18/23 191 lb (86.6 kg)     Body mass index is 27.52 kg/m².     Cholesterol, Total (mg/dL)   Date Value   02/26/2024 190   09/11/2023 211 (H)   10/04/2022 214 (H)     CHOLESTEROL, TOTAL (mg/dL)   Date Value   06/20/2014 231 (H)   04/29/2013 233  (H)     HDL Cholesterol (mg/dL)   Date Value   02/26/2024 58   09/11/2023 54   10/04/2022 48     HDL CHOLESTEROL (mg/dL)   Date Value   06/20/2014 56   04/29/2013 60     LDL Cholesterol (mg/dL)   Date Value   02/26/2024 116 (H)   09/11/2023 133 (H)   10/04/2022 119 (H)     LDL-CHOLESTEROL (mg/dL (calc))   Date Value   06/20/2014 146 (H)   04/29/2013 156 (H)     AST (U/L)   Date Value   09/11/2023 33   11/26/2022 20   11/25/2022 19   01/15/2015 32   06/20/2014 18   04/29/2013 25     ALT (U/L)   Date Value   09/11/2023 56   11/26/2022 37   11/25/2022 33   01/15/2015 59 (H)   06/20/2014 24   04/29/2013 27      PSA (ng/mL)   Date Value   07/27/2017 1.140   07/20/2016 1.170     No results found for: \"GLUCOSE\"    Current Outpatient Medications   Medication Sig Dispense Refill    aspirin 81 MG Oral Tab Take 1 tablet (81 mg total) by mouth daily.        Past Medical History:    Acute, but ill-defined, cerebrovascular disease    Blood in the stool    Hemorrhoids    High cholesterol    Other and unspecified hyperlipidemia    Visual impairment    Vitamin D deficiency    Wears glasses      Past Surgical History:   Procedure Laterality Date    Anesth,knee area surgery      torn ACL left    Appendectomy  03/2009    Appendectomy      Cholecystectomy      Hemorrhoidectomy,int/ext,simple  2018    Other surgical history      left knee surgery    Other surgical history      lipomas    Removal gallbladder      Surgery - internal Left     testicle during childhood      Family History   Problem Relation Age of Onset    Other (grave's disease) Son     Other (Other) Mother         OA    Other (CVA) Mother     Stroke Mother     Cancer Father         prostate cancer      Social History:  Social History     Socioeconomic History    Marital status:    Tobacco Use    Smoking status: Never    Smokeless tobacco: Never   Vaping Use    Vaping status: Never Used   Substance and Sexual Activity    Alcohol use: Yes     Alcohol/week: 3.0  standard drinks of alcohol     Types: 1 Glasses of wine, 1 Cans of beer, 1 Shots of liquor per week     Comment: wine on occasion    Drug use: Never    Sexual activity: Yes     Partners: Female   Other Topics Concern    Caffeine Concern Yes     Comment: 2-3 cups per day coffee    Exercise Yes     Comment: treadmill at home 30 mins 2-3x/wk    Seat Belt Yes     Social Determinants of Health     Food Insecurity: No Food Insecurity (10/26/2023)    Food Insecurity     Food Insecurity: Never true   Transportation Needs: No Transportation Needs (10/26/2023)    Transportation Needs     Lack of Transportation: No   Housing Stability: Low Risk  (10/26/2023)    Housing Stability     Housing Instability: No      Occ: factory : yes. Children: 1.   Exercise: three times per week.  Diet: watches calories closely     REVIEW OF SYSTEMS:   GENERAL: feels well otherwise  SKIN: denies any unusual skin lesions, rash on the face  EYES:denies blurred vision or double vision  HEENT: denies nasal congestion, sinus pain or ST  LUNGS: denies shortness of breath with exertion  CARDIOVASCULAR: denies chest pain on exertion  GI: Lower abdominal pain no heartburn, no diarrhea urination normal  : denies nocturia or changes in stream  MUSCULOSKELETAL: denies back pain  NEURO: denies headaches  PSYCHE: denies depression or anxiety  HEMATOLOGIC: denies hx of anemia  ENDOCRINE: denies thyroid history  ALL/ASTHMA: denies hx of allergy or asthma    EXAM:   /62   Pulse 72   Temp 97.8 °F (36.6 °C) (Temporal)   Resp 16   Ht 5' 10.08\" (1.78 m)   Wt 192 lb 3.2 oz (87.2 kg)   BMI 27.52 kg/m²   Body mass index is 27.52 kg/m².   GENERAL: well developed, well nourished,in no apparent distress  SKIN: some rash on the face.  Skin is irritated  HEENT: atraumatic, normocephalic,ears and throat are clear  EYES:PERRLA, EOMI, conjunctiva are clear  NECK: supple,no adenopathy  CHEST: no chest tenderness  BREAST: no dominant or suspicious  mass  LUNGS: clear to auscultation  CARDIO: RRR without murmur  GI: good BS's,no masses, there is no tenderness.  ; deffered by pt,   RECTAL:deffered by pt,   MUSCULOSKELETAL: back is not tender,FROM of the back  EXTREMITIES: no cyanosis, clubbing or edema  NEURO: Oriented times three,cranial nerves are intact,motor and sensory are grossly intact    Blood work patient done in May 2024 at his work reviewed copy scanned in the system.    ASSESSMENT AND PLAN:   Yanick Rust is a 63 year old male who presents for a complete physical exam.   Encounter Diagnoses   Name Primary?    Annual physical exam Yes    Laboratory examination ordered as part of a routine general medical examination     Screening for genitourinary condition     Screening for prostate cancer     Thyroid nodule     Rash of face     Hypercholesteremia        Orders Placed This Encounter   Procedures    CBC With Differential With Platelet    Comp Metabolic Panel (14)    Lipid Panel    Urinalysis with Culture Reflex    TSH W Reflex To Free T4    PSA Total, Screen       Meds & Refills for this Visit:  Requested Prescriptions      No prescriptions requested or ordered in this encounter     Shingrix-shingles shot.   Healthy diet.  Stay active.   Do labs fasting in October.   Hydrocortisone 1%  cream twice a day for 7-10 days.   Call [phone] to schedule ultrasound of the thyroid.  See dermatologist  if not better.    Discussed with patient possibility of restarting atorvastatin if patient does not want to start medication he understands risk associated with not treating hypercholesterolemia including stroke and heart attack.    Imaging & Consults:  DERM - INTERNAL  US THYROID (CPT=76536)     Pt's weight is Body mass index is 27.52 kg/m²., recommended low carb diet and aerobic exercise 30 minutes three times weekly. Health maintenance, will check fasting Lipids, CMP, CBC and PSA. Pt is up-to-date with screening colonoscopy.. The patient indicates  understanding of these issues and agrees to the plan.  The patient is asked to return for CPX in 1 year.  Sooner depending on the test results.  The note was dictated using speech recognition software.  Accuracy and grammar in transcription may be subject to error.

## 2024-07-23 NOTE — PATIENT INSTRUCTIONS
Shingrix-shingles shot.   Healthy diet.  Stay active.   Do labs fasting in October.   Hydrocortisone 1%  cream twice a day for 7-10 days.   Call [phone] to schedule ultrasound of the thyroid.  See dermatologist  if not better.

## 2025-03-24 ENCOUNTER — APPOINTMENT (OUTPATIENT)
Dept: GENERAL RADIOLOGY | Facility: HOSPITAL | Age: 65
End: 2025-03-24
Attending: EMERGENCY MEDICINE
Payer: COMMERCIAL

## 2025-03-24 ENCOUNTER — NURSE ONLY (OUTPATIENT)
Dept: FAMILY MEDICINE CLINIC | Facility: CLINIC | Age: 65
End: 2025-03-24
Payer: COMMERCIAL

## 2025-03-24 ENCOUNTER — HOSPITAL ENCOUNTER (EMERGENCY)
Facility: HOSPITAL | Age: 65
Discharge: HOME | End: 2025-03-24
Attending: EMERGENCY MEDICINE
Payer: COMMERCIAL

## 2025-03-24 ENCOUNTER — APPOINTMENT (OUTPATIENT)
Dept: CT IMAGING | Facility: HOSPITAL | Age: 65
End: 2025-03-24
Attending: EMERGENCY MEDICINE
Payer: COMMERCIAL

## 2025-03-24 VITALS
BODY MASS INDEX: 26.41 KG/M2 | HEART RATE: 61 BPM | RESPIRATION RATE: 21 BRPM | SYSTOLIC BLOOD PRESSURE: 116 MMHG | DIASTOLIC BLOOD PRESSURE: 80 MMHG | HEIGHT: 72 IN | WEIGHT: 195 LBS | TEMPERATURE: 98 F | OXYGEN SATURATION: 95 %

## 2025-03-24 VITALS
TEMPERATURE: 98 F | DIASTOLIC BLOOD PRESSURE: 80 MMHG | OXYGEN SATURATION: 98 % | RESPIRATION RATE: 20 BRPM | HEART RATE: 88 BPM | SYSTOLIC BLOOD PRESSURE: 132 MMHG

## 2025-03-24 DIAGNOSIS — R51.9 NONINTRACTABLE HEADACHE, UNSPECIFIED CHRONICITY PATTERN, UNSPECIFIED HEADACHE TYPE: Primary | ICD-10-CM

## 2025-03-24 LAB
ALBUMIN SERPL-MCNC: 4.2 G/DL (ref 3.2–4.8)
ALBUMIN/GLOB SERPL: 1.6 {RATIO} (ref 1–2)
ALP LIVER SERPL-CCNC: 62 U/L
ALT SERPL-CCNC: 31 U/L
ANION GAP SERPL CALC-SCNC: 9 MMOL/L (ref 0–18)
AST SERPL-CCNC: 23 U/L (ref ?–34)
ATRIAL RATE: 69 BPM
BASOPHILS # BLD AUTO: 0.03 X10(3) UL (ref 0–0.2)
BASOPHILS NFR BLD AUTO: 0.4 %
BILIRUB SERPL-MCNC: 0.7 MG/DL (ref 0.2–1.1)
BUN BLD-MCNC: 15 MG/DL (ref 9–23)
CALCIUM BLD-MCNC: 9.5 MG/DL (ref 8.7–10.6)
CHLORIDE SERPL-SCNC: 104 MMOL/L (ref 98–112)
CO2 SERPL-SCNC: 28 MMOL/L (ref 21–32)
CREAT BLD-MCNC: 0.95 MG/DL
EGFRCR SERPLBLD CKD-EPI 2021: 89 ML/MIN/1.73M2 (ref 60–?)
EOSINOPHIL # BLD AUTO: 0.18 X10(3) UL (ref 0–0.7)
EOSINOPHIL NFR BLD AUTO: 2.2 %
ERYTHROCYTE [DISTWIDTH] IN BLOOD BY AUTOMATED COUNT: 12.1 %
GLOBULIN PLAS-MCNC: 2.6 G/DL (ref 2–3.5)
GLUCOSE BLD-MCNC: 104 MG/DL (ref 70–99)
HCT VFR BLD AUTO: 44 %
HGB BLD-MCNC: 15.3 G/DL
IMM GRANULOCYTES # BLD AUTO: 0.02 X10(3) UL (ref 0–1)
IMM GRANULOCYTES NFR BLD: 0.2 %
LYMPHOCYTES # BLD AUTO: 1.69 X10(3) UL (ref 1–4)
LYMPHOCYTES NFR BLD AUTO: 20.3 %
MCH RBC QN AUTO: 31.9 PG (ref 26–34)
MCHC RBC AUTO-ENTMCNC: 34.8 G/DL (ref 31–37)
MCV RBC AUTO: 91.9 FL
MONOCYTES # BLD AUTO: 0.66 X10(3) UL (ref 0.1–1)
MONOCYTES NFR BLD AUTO: 7.9 %
NEUTROPHILS # BLD AUTO: 5.75 X10 (3) UL (ref 1.5–7.7)
NEUTROPHILS # BLD AUTO: 5.75 X10(3) UL (ref 1.5–7.7)
NEUTROPHILS NFR BLD AUTO: 69 %
OSMOLALITY SERPL CALC.SUM OF ELEC: 293 MOSM/KG (ref 275–295)
P AXIS: 54 DEGREES
P-R INTERVAL: 144 MS
PLATELET # BLD AUTO: 190 10(3)UL (ref 150–450)
PLATELETS.RETICULATED NFR BLD AUTO: 3.5 % (ref 0–7)
POTASSIUM SERPL-SCNC: 3.3 MMOL/L (ref 3.5–5.1)
PROT SERPL-MCNC: 6.8 G/DL (ref 5.7–8.2)
Q-T INTERVAL: 400 MS
QRS DURATION: 98 MS
QTC CALCULATION (BEZET): 428 MS
R AXIS: -25 DEGREES
RBC # BLD AUTO: 4.79 X10(6)UL
SODIUM SERPL-SCNC: 141 MMOL/L (ref 136–145)
T AXIS: 16 DEGREES
TROPONIN I SERPL HS-MCNC: 33 NG/L
VENTRICULAR RATE: 69 BPM
WBC # BLD AUTO: 8.3 X10(3) UL (ref 4–11)

## 2025-03-24 PROCEDURE — 85025 COMPLETE CBC W/AUTO DIFF WBC: CPT | Performed by: EMERGENCY MEDICINE

## 2025-03-24 PROCEDURE — 99285 EMERGENCY DEPT VISIT HI MDM: CPT

## 2025-03-24 PROCEDURE — 93010 ELECTROCARDIOGRAM REPORT: CPT

## 2025-03-24 PROCEDURE — 84484 ASSAY OF TROPONIN QUANT: CPT | Performed by: EMERGENCY MEDICINE

## 2025-03-24 PROCEDURE — 71045 X-RAY EXAM CHEST 1 VIEW: CPT | Performed by: EMERGENCY MEDICINE

## 2025-03-24 PROCEDURE — 36415 COLL VENOUS BLD VENIPUNCTURE: CPT

## 2025-03-24 PROCEDURE — 80053 COMPREHEN METABOLIC PANEL: CPT | Performed by: EMERGENCY MEDICINE

## 2025-03-24 PROCEDURE — 93005 ELECTROCARDIOGRAM TRACING: CPT

## 2025-03-24 PROCEDURE — 3075F SYST BP GE 130 - 139MM HG: CPT | Performed by: FAMILY MEDICINE

## 2025-03-24 PROCEDURE — 3079F DIAST BP 80-89 MM HG: CPT | Performed by: FAMILY MEDICINE

## 2025-03-24 PROCEDURE — 70496 CT ANGIOGRAPHY HEAD: CPT | Performed by: EMERGENCY MEDICINE

## 2025-03-24 NOTE — ED PROVIDER NOTES
Patient Seen in: [facility] Department      History     Chief Complaint   Patient presents with    Headache     Stated Complaint: took viagra last night, concern for ACS. Vitals WNL. Refused 911.    Subjective:   HPI      64-year-old male comes to the emergency room complaining of his pressure by his head and by his jaw region.  The patient took Viagra last night.  He was sent by his primary physician to evaluate for acute coronary syndrome.  He has a known history of having a cerebral aneurysm.  He takes an aspirin a day.  He denies any other visual change.  No numbness, tingling or weakness.  Denies any nausea or vomiting.  No visual changes.  No chest pain.  Not short of breath.  Denies any abdominal pains.  He is denying any diaphoresis.  No other complaints at this time.    Objective:     Past Medical History:    Acute, but ill-defined, cerebrovascular disease    Blood in the stool    Hemorrhoids    High cholesterol    Other and unspecified hyperlipidemia    Visual impairment    Vitamin D deficiency    Wears glasses              Past Surgical History:   Procedure Laterality Date    Anesth,knee area surgery      torn ACL left    Appendectomy  03/2009    Appendectomy      Cholecystectomy      Hemorrhoidectomy,int/ext,simple  2018    Other surgical history      left knee surgery    Other surgical history      lipomas    Removal gallbladder      Surgery - internal Left     testicle during childhood                Social History     Socioeconomic History    Marital status:    Tobacco Use    Smoking status: Never    Smokeless tobacco: Never   Vaping Use    Vaping status: Never Used   Substance and Sexual Activity    Alcohol use: Yes     Alcohol/week: 3.0 standard drinks of alcohol     Types: 1 Glasses of wine, 1 Cans of beer, 1 Shots of liquor per week     Comment: wine on occasion    Drug use: Never    Sexual activity: Yes     Partners: Female   Other Topics Concern    Caffeine Concern Yes      Comment: 2-3 cups per day coffee    Exercise Yes     Comment: treadmill at home 30 mins 2-3x/wk    Seat Belt Yes     Social Drivers of Health     Food Insecurity: No Food Insecurity (10/26/2023)    Food Insecurity     Food Insecurity: Never true   Transportation Needs: No Transportation Needs (10/26/2023)    Transportation Needs     Lack of Transportation: No   Housing Stability: Low Risk  (10/26/2023)    Housing Stability     Housing Instability: No                  Physical Exam     ED Triage Vitals [03/24/25 1051]   /82   Pulse 81   Resp 20   Temp 97.7 °F (36.5 °C)   Temp src Oral   SpO2 95 %   O2 Device None (Room air)       Current Vitals:   Vital Signs  BP: 120/72  Pulse: 62  Resp: 16  Temp: 97.7 °F (36.5 °C)  Temp src: Oral  MAP (mmHg): 88    Oxygen Therapy  SpO2: 100 %  O2 Device: None (Room air)        Physical Exam  HEENT : NCAT, EOMI, PEERL,  neck supple, no JVD, trachea midline, No LAD  Heart: S1S2 normal. No murmurs, regular rate and rhythm  Lungs: Clear to auscultation bilaterally  Abdomen: Soft nontender nondistended normal active bowel sounds without rebound, guarding or masses noted  Back nontender without CVA tenderness  Extremity no clubbing, cyanosis or edema noted.  Full range of motion noted without tenderness  Neuro: No focal deficits noted    All measures to prevent infection transmission during my interaction with the patient were taken.  The patient was already wearing droplet mask on my arrival to the room.  Personal protective equipment including a droplet mask as well as gloves were worn throughout the duration of my exam.  Hand washing was performed prior to and after the exam.  Stethoscope and equipment used during my examination was cleaned with a super Sani cloth germicidal wipe following the exam.    ED Course     Labs Reviewed   COMP METABOLIC PANEL (14) - Abnormal; Notable for the following components:       Result Value    Glucose 104 (*)     Potassium 3.3 (*)     All  other components within normal limits   TROPONIN I HIGH SENSITIVITY - Normal   CBC WITH DIFFERENTIAL WITH PLATELET   SCAN SLIDE   RAINBOW DRAW LAVENDER   RAINBOW DRAW LIGHT GREEN   RAINBOW DRAW BLUE   RAINBOW DRAW GOLD     EKG    Rate, intervals and axes as noted on EKG Report.  Rate: 69  Rhythm: Sinus Rhythm  Reading: , QRS of 90, patient is a normal sinus rhythm noted with PVCs           ED Course as of 03/24/25 1240  ------------------------------------------------------------  Time: 03/24 1236  Comment: While here the patient's CMP was unremarkable.  CBC was normal.  The patient troponin was 33.  The patient a chest x-ray that appears interpreted no acute cardiopulmonary process.  He is CT angiogram of his brain that was normal as well.  I reviewed the radiology reports as well.       CTA BRAIN (CPT=70496)    Result Date: 3/24/2025  PROCEDURE:  CTA BRAIN (CPT=70496)  COMPARISON:  VIRGILIO TARANGO, CTA BRAIN (CPT=70496), 5/30/2019, 7:46 AM.  INDICATIONS:  took viagra last night, concern for ACS. Vitals WNL. Refused 911.  TECHNIQUE:  Unenhanced followed by contrast enhanced multislice CT angiography of the brain vasculature using non-ionic contrast. Multiplanar 3D reformatted imaging as well as multiplanar MIP images were obtained. Dose reduction techniques were used. Dose information is transmitted to the ACR (American College of Radiology) NRDR (National Radiology Data Registry) which includes the Dose Index Registry.  PATIENT STATED HISTORY:(As transcribed by Technologist)  Patient is here with pressure in the face and head.   CONTRAST USED:  60cc of Isovue 370  FINDINGS:  Ventricles and sulci are within normal limits.  There is no focal parenchymal attenuation abnormality.  There is no midline shift or mass-effect.  The basal cisterns are patent.  The gray-white matter differentiation is intact.  There is no acute intracranial hemorrhage or extra-axial fluid collection.   There is no evident fracture.  Mild  mucosal thickening within the ethmoid sinus.  The upper cervical, petrous, cavernous, and supraclinoid internal carotid arteries are unremarkable.  An anterior communicating artery is seen.  The branches of the anterior cerebral and middle cerebral arteries are unremarkable.  There is a right fetal type posterior cerebral artery.  A small left posterior communicating artery is identified.  The branches of the posterior cerebral and superior cerebellar arteries are patent.   The intracranial vertebral arteries are patent.  There is marked focal tortuosity of the basilar artery with dolichectasia.  This has a similar appearance since 5/30/2019.  No discrete saccular aneurysm is seen at the basilar artery.  There is tortuosity  of the intracranial vertebral arteries.             CONCLUSION:  1. No acute intracranial abnormality is identified. If there is clinical concern for acute ischemia/infarction, an MRI of the brain would be recommended for further evaluation. 2. There is marked focal tortuosity of the basilar artery with dolichoectasia.  This has a similar appearance since 5/30/2019.  No definitive saccular aneurysm is seen at the basilar artery.  There is tortuosity of the intracranial vertebral arteries. 3. No high-grade stenosis or occlusion is seen within the major intracranial arterial vasculature.   LOCATION:  [id number]   Dictated by (CST): Stromberg, LeRoy, MD on 3/24/2025 at 12:07 PM     Finalized by (CST): Stromberg, LeRoy, MD on 3/24/2025 at 12:20 PM       XR CHEST AP PORTABLE  (CPT=71045)    Result Date: 3/24/2025  PROCEDURE:  XR CHEST AP PORTABLE  (CPT=71045)  TECHNIQUE:  AP chest radiograph was obtained.  COMPARISON:  None.  INDICATIONS:  took viagra last night, concern for ACS. Vitals WNL. Refused 911.  PATIENT STATED HISTORY: (As transcribed by Technologist)  Patient offered no additional history at this time.               CONCLUSION:  The heart size is within normal limits.  No definite CHF.  Slight  vascular redistribution without interstitial edema.  No effusion.  Minimal right infrahilar discoid atelectasis.  No mass, lymphadenopathy, pneumothorax, or definite consolidation.   LOCATION:  [place] [id number]      Dictated by (CST): Dario Hunter MD on 3/24/2025 at 11:55 AM     Finalized by (CST): Dario Hunter MD on 3/24/2025 at 11:56 AM        Medications   iopamidol 76% (ISOVUE-370) injection for power injector (60 mL Intravenous Given 3/24/25 1155)            MDM      Differential diagnosis includes intracranial abnormality, cardiac abnormality but not limited such.  The patient's medical workup here has been unremarkable at this time the patient will be discharged home and follow-up.  Patient was screened and evaluated during this visit.   As a treating physician attending to the patient, I determined, within reasonable clinical confidence and prior to discharge, that an emergency medical condition was not or was no longer present.  There was no indication for further evaluation, treatment or admission on an emergency basis.       The usual and customary discharge instuctions were discussed given the patient's ER course.  We discussed signs and symptoms that should prompt the patient's immediate return to the emergency department.   Reasonable over the counter and prescription treatment options and Physician follow up plan was discussed.       The patient is discharged in good condition.     This note was prepared using Dragon Medical voice recognition dictation software.  As a result errors may occur.  When identified to these areas have been corrected.  While every attempt is made to correct errors during dictation discrepancies may still exist.  Please contact if there are any errors.            Medical Decision Making      Disposition and Plan     Clinical Impression:  1. Nonintractable headache, unspecified chronicity pattern, unspecified headache type         Disposition:  Discharge  3/24/2025 12:39  pm    Follow-up:  Gloria Salazar MD  [address] [place] [address] [id number] [phone]    Schedule an appointment as soon as possible for a visit in 2 day(s)            Medications Prescribed:  Current Discharge Medication List              Supplementary Documentation:

## 2025-03-24 NOTE — ED QUICK NOTES
Pt ambulated to bathroom. Pt ambulating w/ steady gait. Denies c/o dizziness, blurred vision, N/V/D.

## 2025-03-24 NOTE — PROGRESS NOTES
Patient walked in and requested to have his BP checked. Patient reported he started feeling flushed and warm in his face yesterday. He has a little bit of whisky yesterday and a tablet of Viagra. Denies any dizziness, SOB, or chest pain. JM=223/80, P=88, temp=97.8F. He is still feeling flushed but no headache. Spoke with Dr. Salazar. With orders to send the pt to ER. Offered to call 911. Patient refused, he said he can drive. Patient understood importance of going to the emergency room. Patient said he will go to ER now. Called ER number [id number], spoke with the ER nurse and gave report.

## 2025-03-24 NOTE — ED INITIAL ASSESSMENT (HPI)
Patient to ED with c/o feeling pressure in his face. He states he has hx of aneurysm last scanned years ago. Patient mentions he did take Viagra around 2000 last night. Denies NENA, JOE.

## 2025-03-28 ENCOUNTER — PATIENT OUTREACH (OUTPATIENT)
Dept: CASE MANAGEMENT | Age: 65
End: 2025-03-28

## 2025-03-28 NOTE — PROGRESS NOTES
ED Hospital Follow up for pcp (Discharge 3/24 edw)       PCP   Gloria Salazar  [address] [place] [id number]   [phone]  Existing appt for 4/12@9:30am; sooner appt     Attempt #1:  Left message on voicemail for patient to call transitions specialist back to schedule follow up appointments. Provided Transitions specialist scheduling phone number [phone].

## 2025-03-31 NOTE — PROGRESS NOTES
ED Hospital Follow up for pcp (Discharge 3/24 edw)       PCP   Gloria Salazar  [address] [place] [address] [phone]  Existing appt for 4/12@9:30am:unable to contact after multiple attempts   Attempt #2:  Left message on voicemail for patient to call transitions specialist back to schedule follow up appointments. Provided Transitions specialist scheduling phone number [phone]. Closing encounter. Will re-open if patient returns call.

## 2025-04-12 ENCOUNTER — OFFICE VISIT (OUTPATIENT)
Dept: FAMILY MEDICINE CLINIC | Facility: CLINIC | Age: 65
End: 2025-04-12
Payer: COMMERCIAL

## 2025-04-12 VITALS
DIASTOLIC BLOOD PRESSURE: 70 MMHG | SYSTOLIC BLOOD PRESSURE: 113 MMHG | HEIGHT: 72 IN | HEART RATE: 62 BPM | TEMPERATURE: 97 F | BODY MASS INDEX: 26.82 KG/M2 | WEIGHT: 198 LBS

## 2025-04-12 DIAGNOSIS — N52.9 ERECTILE DYSFUNCTION, UNSPECIFIED ERECTILE DYSFUNCTION TYPE: ICD-10-CM

## 2025-04-12 DIAGNOSIS — D22.9 MULTIPLE NEVI: ICD-10-CM

## 2025-04-12 DIAGNOSIS — E87.6 HYPOKALEMIA: Primary | ICD-10-CM

## 2025-04-12 DIAGNOSIS — L98.9 SKIN LESION OF SCALP: ICD-10-CM

## 2025-04-12 DIAGNOSIS — R39.12 BENIGN PROSTATIC HYPERPLASIA WITH WEAK URINARY STREAM: ICD-10-CM

## 2025-04-12 DIAGNOSIS — N40.1 BENIGN PROSTATIC HYPERPLASIA WITH WEAK URINARY STREAM: ICD-10-CM

## 2025-04-12 PROCEDURE — 3078F DIAST BP <80 MM HG: CPT | Performed by: FAMILY MEDICINE

## 2025-04-12 PROCEDURE — 99214 OFFICE O/P EST MOD 30 MIN: CPT | Performed by: FAMILY MEDICINE

## 2025-04-12 PROCEDURE — G2211 COMPLEX E/M VISIT ADD ON: HCPCS | Performed by: FAMILY MEDICINE

## 2025-04-12 PROCEDURE — 3074F SYST BP LT 130 MM HG: CPT | Performed by: FAMILY MEDICINE

## 2025-04-12 PROCEDURE — 3008F BODY MASS INDEX DOCD: CPT | Performed by: FAMILY MEDICINE

## 2025-04-12 RX ORDER — TAMSULOSIN HYDROCHLORIDE 0.4 MG/1
0.4 CAPSULE ORAL DAILY
Qty: 30 CAPSULE | Refills: 2 | Status: SHIPPED | OUTPATIENT
Start: 2025-04-12

## 2025-04-12 RX ORDER — SILDENAFIL 50 MG/1
50 TABLET, FILM COATED ORAL
Qty: 30 TABLET | Refills: 0 | Status: SHIPPED | OUTPATIENT
Start: 2025-04-12

## 2025-04-12 NOTE — PATIENT INSTRUCTIONS
Call dermatologist for evaluation of the lesion on the scalp and multiple nevi on the body.  Start tamsulosin/Flomax 0.4 mg 1 tablet before bedtime monitor your urinary stream.  Continue good hydration.  Monitor blood pressure periodically.  Use Viagra 50 mg as needed.  Healthy diet.  Have potassium rich foods like oranges banana tomato coconut water prunes.  Recheck potassium level next week.    VISIT SUMMARY:  Today, you were seen for a skin lesion on your head, urinary symptoms, We also discussed your previous low potassium level and how to manage it.    YOUR PLAN:  -SKIN LESION ON HEAD: You have a skin lesion on your head that has grown over the past 15 years and sometimes itches and bleeds. Given its growth and changes, we recommend seeing a dermatologist, Dr. Schmid, for evaluation and possible removal. The lesion will be sent for pathology to ensure it is not cancerous. Your insurance is expected to cover this due to the lesion's changes.    -URINARY SYMPTOMS: Your urinary symptoms are likely due to an enlarged prostate, which is common as men age. We discussed starting tamsulosin to help relieve these symptoms. You should take this medication at bedtime and monitor your blood pressure, as it can sometimes cause low blood pressure. We will schedule a follow-up to reassess your symptoms and consider further evaluation if needed.    -ERECTILE DYSFUNCTION: You requested a refill for your erectile dysfunction medication. We have prescribed sildenafil 50 mg to be taken as needed. Please check the expiration date on your current medication and consider using GoodRx for cost savings if your insurance does not cover it.    -LOW POTASSIUM (HYPOKALEMIA): You had a low potassium level during a recent ER visit. We recommend continuing to eat potassium-rich foods like tomatoes, bananas, avocados, coconut water, and potatoes. We will also order a blood test to check your potassium levels next week.    INSTRUCTIONS:  Please  follow up with Dr. Schmid for the evaluation and potential removal of your skin lesion. Start taking tamsulosin at bedtime and monitor your blood pressure. We will schedule a follow-up appointment to reassess your urinary symptoms. Check the expiration date on your erectile dysfunction medication and use GoodRx for cost savings if needed. Continue eating potassium-rich foods and get a blood test next week to check your potassium levels.    Contains text generated by [employer]       Refill policies:      Allow 3 business days for refills; controlled substances may take longer.  Contact your pharmacy at least 5-7 business days prior to running out of medication and have them send an electronic request or submit through the \"request refill\" option thru your [employer] account. No need to do both, as multiple requests will create an automated [employer] message to notify of a denial for one of the duplicated requests, causing you undue confusion.   Refills are NOT addressed on weekends; covering physicians do not authorize routine medications on weekends.  No narcotics or controlled substances are refilled after noon on Fridays or by on call physicians.  By law, narcotics cannot be faxed or phoned into your pharmacy.  If your prescription is due for a refill, you may be due for a follow up appointment. Please call our office at [phone] to make an appointment or schedule an appointment via [employer].  To best provide you care, patients receiving routine medications need to be seen at least twice a year. Patients receiving narcotic/controlled substance medications need to be seen at least once every 3 months.  In the event that your preferred pharmacy does not have the requested medication in stock (ie Backordered), it is your responsibility to find another pharmacy that has the requested medication available. We will gladly send a new prescription to that pharmacy at your request.  controlled substances may not be able to be  filled out of state due to license restrictions.  If you have a planned trip, it's best to call your pharmacy at least 5-7 business days to prevent any delays in your medication refill.    Scheduling Tests:    If your physician has ordered radiology tests such as MRI or CT scans, please contact Central Scheduling at [phone] right away to schedule the test.  Once scheduled, the [facility] Centralized Referral Team will work with your insurance carrier to obtain pre-certification or prior authorization.  Depending on your insurance carrier, approval may take 3-10 days.  It is highly recommended patients assure they have received an authorization before having a test performed.  If test is done without insurance authorization, patient may be responsible for the entire amount billed.      Precertification and Prior Authorizations:  If your physician has recommended that you have a procedure or additional testing performed the [facility] Centralized Referral Team will contact your insurance carrier to obtain pre-certification or prior authorization.    You are strongly encouraged to contact your insurance carrier to verify that your procedure/test has been approved and is a COVERED benefit.  Although the [facility] Centralized Referral Team does its due diligence, the insurance carrier gives the disclaimer that \"Although the procedure is authorized, this does not guarantee payment.\"    Ultimately the patient is responsible for payment.   Thank you for your understanding in this matter.  Paperwork Completion:  If you require FMLA or disability paperwork for your recovery, please make sure to either drop it off or have it faxed to our office at [phone]. Be sure the form has your name and date of birth on it.  The form will be faxed to our Forms Department and they will complete it for you.  There is a 25$ fee for all forms that need to be filled out.  Please be aware there is a 10-14 day turnaround time.  You will need to sign a  release of information (PAT) form if your paperwork does not come with one.  You may call the Forms Department with any questions at [phone].  Their fax number is [phone].

## 2025-04-12 NOTE — PROGRESS NOTES
Yanick Rust is a 64 year old male.  Lump neck, slow urinary stream, ED, low potassium  HPI:     The following individual(s) verbally consented to be recorded using ambient AI listening technology and understand that they can each withdraw their consent to this listening technology at any point by asking the clinician to turn off or pause the recording:    Patient name: Yanick Rust      History of Present Illness  Yanick Rust is a 64 year old female who presents with a skin lesion on the head, also discussed urinary symptoms.    She has a skin lesion on the head that has been present for approximately 15 years. Initially small, it has grown in size over time. It is described as a 'skintag' or a raised growth that is sometimes itchy. There is no associated pain, but it occasionally bleeds when accidentally scratched or cut during hair washing. She has a history of face esion which was previously treated with excision and cryotherapy. The lesion was sent for pathology, and the results were benign. She is concerned about the current lesion due to its growth and changes.    She reports urinary symptoms, including incomplete bladder emptying and post-void dribbling, which she attributes to age-related changes. No pain or burning during urination. She recalls a prostate examination last year, which was normal, and mentions a history of epididymitis treated with antibiotics.  Will start Flomax.    She notes a previous low potassium level identified during an emergency room visit, which she manages with dietary intake of potassium-rich foods like tomatoes and bananas.    She is currently taking a medication at a dose of 50 mg, which she cuts in half for use. She has a few pills remaining from a prescription filled two years ago and is unsure if they are still effective. She mentions that she has been using them occasionally.     Current Medications[1]   Past Medical History[2]   Social History:  Short Social Hx on  File[3]     REVIEW OF SYSTEMS:   GENERAL HEALTH: feels well otherwise  SKIN: denies any unusual skin lesions or rashes, scabbed lesion  HEENT no congestion no runny nose no sore throat  Neck no neck pain   RESPIRATORY: denies shortness of breath with exertion  CARDIOVASCULAR: denies chest pain on exertion  GI: denies abdominal pain and denies heartburn    slow urinary stream  NEURO: denies headaches  Psych normal mood.    EXAM:   /70 (BP Location: Right arm, Patient Position: Sitting, Cuff Size: adult)   Pulse 62   Temp 96.9 °F (36.1 °C) (Temporal)   Ht 6' (1.829 m)   Wt 198 lb (89.8 kg)   BMI 26.85 kg/m²   GENERAL: well developed, well nourished,in no apparent distress  SKIN: lesion of the scalp per picture below, multiple nevi  HEENT: atraumatic, normocephalic,  NECK: supple,no adenopathy,  LUNGS: clear to auscultation  CARDIO: RRR without murmur  GI: good BS's,no masses, HSM or tenderness  EXTREMITIES: no cyanosis, clubbing or edema  Psychiatric - alert  and oriented x3, normal mood      Results for orders placed or performed during the hospital encounter of 03/24/25   EKG    Collection Time: 03/24/25 10:54 AM   Result Value Ref Range    Ventricular rate 69 BPM    Atrial rate 69 BPM    P-R Interval 144 ms    QRS Duration 98 ms    Q-T Interval 400 ms    QTC Calculation (Bezet) 428 ms    P Axis 54 degrees    R Axis -25 degrees    T Axis 16 degrees   Comp Metabolic Panel (14)    Collection Time: 03/24/25 10:59 AM   Result Value Ref Range    Glucose 104 (H) 70 - 99 mg/dL    Sodium 141 136 - 145 mmol/L    Potassium 3.3 (L) 3.5 - 5.1 mmol/L    Chloride 104 98 - 112 mmol/L    CO2 28.0 21.0 - 32.0 mmol/L    Anion Gap 9 0 - 18 mmol/L    BUN 15 9 - 23 mg/dL    Creatinine 0.95 0.70 - 1.30 mg/dL    Calcium, Total 9.5 8.7 - 10.6 mg/dL    Calculated Osmolality 293 275 - 295 mOsm/kg    eGFR-Cr 89 >=60 mL/min/1.73m2    AST 23 <34 U/L    ALT 31 10 - 49 U/L    Alkaline Phosphatase 62 45 - 117 U/L    Bilirubin, Total  0.7 0.2 - 1.1 mg/dL    Total Protein 6.8 5.7 - 8.2 g/dL    Albumin 4.2 3.2 - 4.8 g/dL    Globulin  2.6 2.0 - 3.5 g/dL    A/G Ratio 1.6 1.0 - 2.0   CBC With Differential With Platelet    Collection Time: 03/24/25 10:59 AM   Result Value Ref Range    WBC 8.3 4.0 - 11.0 x10(3) uL    RBC 4.79 3.80 - 5.80 x10(6)uL    HGB 15.3 13.0 - 17.5 g/dL    HCT 44.0 39.0 - 53.0 %    .0 150.0 - 450.0 10(3)uL    Immature Platelet Fraction 3.5 0.0 - 7.0 %    MCV 91.9 80.0 - 100.0 fL    MCH 31.9 26.0 - 34.0 pg    MCHC 34.8 31.0 - 37.0 g/dL    RDW 12.1 %    Neutrophil Absolute Prelim 5.75 1.50 - 7.70 x10 (3) uL    Neutrophil Absolute 5.75 1.50 - 7.70 x10(3) uL    Lymphocyte Absolute 1.69 1.00 - 4.00 x10(3) uL    Monocyte Absolute 0.66 0.10 - 1.00 x10(3) uL    Eosinophil Absolute 0.18 0.00 - 0.70 x10(3) uL    Basophil Absolute 0.03 0.00 - 0.20 x10(3) uL    Immature Granulocyte Absolute 0.02 0.00 - 1.00 x10(3) uL    Neutrophil % 69.0 %    Lymphocyte % 20.3 %    Monocyte % 7.9 %    Eosinophil % 2.2 %    Basophil % 0.4 %    Immature Granulocyte % 0.2 %   Troponin I (High Sensitivity)    Collection Time: 03/24/25 10:59 AM   Result Value Ref Range    Troponin I (High Sensitivity) 33 <=53 ng/L   Scan slide    Collection Time: 03/24/25 10:59 AM   Result Value Ref Range    Slide Review       Slide reviewed, normal WBC, RBC, and platelet morphology.   RAINBOW DRAW LAVENDER    Collection Time: 03/24/25 10:59 AM   Result Value Ref Range    Hold Lavender Auto Resulted    RAINBOW DRAW LIGHT GREEN    Collection Time: 03/24/25 10:59 AM   Result Value Ref Range    Hold Lt Green Auto Resulted    RAINBOW DRAW BLUE    Collection Time: 03/24/25 10:59 AM   Result Value Ref Range    Hold Blue Auto Resulted    RAINBOW DRAW GOLD    Collection Time: 03/24/25 10:59 AM   Result Value Ref Range    Hold Gold Auto Resulted       ASSESSMENT AND PLAN:     Encounter Diagnoses   Name Primary?    Hypokalemia Yes    Multiple nevi     Skin lesion of scalp     Benign  prostatic hyperplasia with weak urinary stream     Erectile dysfunction, unspecified erectile dysfunction type      Assessment & Plan  Skin Lesion on Head  Longstanding skin lesion on head increased in size, occasionally itches and bleeds. Removal recommended due to growth and bleeding potential. History of basal cell carcinoma removal by Dr. Schmid.  - Refer to dermatologist Dr. Schmid for evaluation and potential removal.  - Consider surgical referral if necessary.  - Send lesion for pathology post-removal to rule out malignancy.  - Insurance coverage anticipated due to lesion's growth and changes.    Urinary Symptoms/BPH diagnosed by urologist  Urinary symptoms likely related to BPH. Tamsulosin discussed to alleviate symptoms. Previous prostate examination normal. Monitoring advised due to potential hypotensive effects.  - Prescribe tamsulosin at bedtime.  - Advise monitoring blood pressure.  - Schedule follow-up to reassess symptoms and consider further prostate evaluation.    Erectile Dysfunction  Medication for erectile dysfunction used, refill requested. Discussed using GoodRx for cost savings if insurance does not cover.  - Prescribe sildenafil 50 mg as needed.  - Send prescription to [facility] pharmacy.  - Advise checking medication expiration and using GoodRx for cost savings.    Low Potassium (Hypokalemia)  Recent ER visit revealed hypokalemia. Advised dietary intake of potassium-rich foods as previous levels were normal.  - Order blood test to check potassium levels next week.  - Advise dietary intake of potassium-rich foods such as tomatoes, bananas, avocados, coconut water, and potatoes.     Orders Placed This Encounter   Procedures    Basic Metabolic Panel (8) [E]       Meds & Refills for this Visit:  Requested Prescriptions     Signed Prescriptions Disp Refills    Sildenafil Citrate 50 MG Oral Tab 30 tablet 0     Sig: Take 1 tablet (50 mg total) by mouth daily as needed for Erectile Dysfunction.     tamsulosin 0.4 MG Oral Cap 30 capsule 2     Sig: Take 1 capsule (0.4 mg total) by mouth daily.     Call dermatologist for evaluation of the lesion on the scalp and multiple nevi on the body.  Start tamsulosin/Flomax 0.4 mg 1 tablet before bedtime monitor your urinary stream.  Continue good hydration.  Monitor blood pressure periodically.  Use Viagra 50 mg as needed.  Healthy diet.  Have potassium rich foods like oranges banana tomato coconut water prunes.  Recheck potassium level next week.    Imaging & Consults:  DERM - INTERNAL         VISIT SUMMARY:  Today, you were seen for a skin lesion on your head, urinary symptoms, We also discussed your previous low potassium level and how to manage it.    YOUR PLAN:  -SKIN LESION ON HEAD: You have a skin lesion on your head that has grown over the past 15 years and sometimes itches and bleeds. Given its growth and changes, we recommend seeing a dermatologist, Dr. Schmid, for evaluation and possible removal. The lesion will be sent for pathology to ensure it is not cancerous. Your insurance is expected to cover this due to the lesion's changes.    -URINARY SYMPTOMS: Your urinary symptoms are likely due to an enlarged prostate, which is common as men age. We discussed starting tamsulosin to help relieve these symptoms. You should take this medication at bedtime and monitor your blood pressure, as it can sometimes cause low blood pressure. We will schedule a follow-up to reassess your symptoms and consider further evaluation if needed.    -ERECTILE DYSFUNCTION: You requested a refill for your erectile dysfunction medication. We have prescribed sildenafil 50 mg to be taken as needed. Please check the expiration date on your current medication and consider using GoodRx for cost savings if your insurance does not cover it.    -LOW POTASSIUM (HYPOKALEMIA): You had a low potassium level during a recent ER visit. We recommend continuing to eat potassium-rich foods like tomatoes,  bananas, avocados, coconut water, and potatoes. We will also order a blood test to check your potassium levels next week.    INSTRUCTIONS:  Please follow up with Dr. Schmid for the evaluation and potential removal of your skin lesion. Start taking tamsulosin at bedtime and monitor your blood pressure. We will schedule a follow-up appointment to reassess your urinary symptoms. Check the expiration date on your erectile dysfunction medication and use GoodRx for cost savings if needed. Continue eating potassium-rich foods and get a blood test next week to check your potassium levels.    Contains text generated by Jermaine   The patient indicates understanding of these issues and agrees to the plan.  The patient is asked to return in July for CPE.          [1]   Current Outpatient Medications   Medication Sig Dispense Refill    Sildenafil Citrate 50 MG Oral Tab Take 1 tablet (50 mg total) by mouth daily as needed for Erectile Dysfunction. 30 tablet 0    tamsulosin 0.4 MG Oral Cap Take 1 capsule (0.4 mg total) by mouth daily. 30 capsule 2    aspirin 81 MG Oral Tab Take 1 tablet (81 mg total) by mouth daily.     [2]   Past Medical History:   Acute, but ill-defined, cerebrovascular disease    Blood in the stool    Hemorrhoids    High cholesterol    Other and unspecified hyperlipidemia    Visual impairment    Vitamin D deficiency    Wears glasses   [3]   Social History  Socioeconomic History    Marital status:    Tobacco Use    Smoking status: Never    Smokeless tobacco: Never   Vaping Use    Vaping status: Never Used   Substance and Sexual Activity    Alcohol use: Yes     Alcohol/week: 3.0 standard drinks of alcohol     Types: 1 Glasses of wine, 1 Cans of beer, 1 Shots of liquor per week     Comment: wine on occasion    Drug use: Never    Sexual activity: Yes     Partners: Female   Other Topics Concern    Caffeine Concern Yes     Comment: 2-3 cups per day coffee    Exercise Yes     Comment: treadmill at home 30 mins  2-3x/wk    Seat Belt Yes     Social Drivers of Health     Food Insecurity: No Food Insecurity (10/26/2023)    Food Insecurity     Food Insecurity: Never true   Transportation Needs: No Transportation Needs (10/26/2023)    Transportation Needs     Lack of Transportation: No   Housing Stability: Low Risk  (10/26/2023)    Housing Stability     Housing Instability: No

## 2025-04-15 ENCOUNTER — LAB REQUISITION (OUTPATIENT)
Dept: LAB | Facility: HOSPITAL | Age: 65
End: 2025-04-15
Payer: COMMERCIAL

## 2025-04-15 DIAGNOSIS — D48.2 NEOPLASM OF UNCERTAIN BEHAVIOR OF PERIPHERAL NERVES AND AUTONOMIC NERVOUS SYSTEM: ICD-10-CM

## 2025-04-15 PROCEDURE — 88305 TISSUE EXAM BY PATHOLOGIST: CPT | Performed by: PHYSICIAN ASSISTANT

## 2025-07-15 NOTE — ANESTHESIA PROCEDURE NOTES
Airway  Date/Time: 10/26/2023 10:23 AM  Urgency: elective    Airway not difficult    General Information and Staff    Patient location during procedure: OR  Anesthesiologist: Gardenia Perales MD  Performed: anesthesiologist   Performed by: Gardenia Perales MD  Authorized by:  Gardenia Perales MD      Indications and Patient Condition  Indications for airway management: anesthesia  Sedation level: deep  Preoxygenated: yes  Patient position: sniffing  Mask difficulty assessment: 1 - vent by mask    Final Airway Details  Final airway type: endotracheal airway      Successful airway: ETT  Cuffed: yes   Successful intubation technique: direct laryngoscopy  Facilitating devices/methods: intubating stylet and cricoid pressure  Endotracheal tube insertion site: oral  Blade: Clifford  Blade size: #3  ETT size (mm): 7.5    Cormack-Lehane Classification: grade IIB - view of arytenoids or posterior of glottis only  Placement verified by: capnometry   Measured from: lips  ETT to lips (cm): 23  Number of attempts at approach: 1  Number of other approaches attempted: 0
none

## 2025-07-24 ENCOUNTER — HOSPITAL ENCOUNTER (OUTPATIENT)
Age: 65
Discharge: HOME | End: 2025-07-24

## 2025-07-24 VITALS
HEART RATE: 67 BPM | WEIGHT: 190 LBS | SYSTOLIC BLOOD PRESSURE: 132 MMHG | TEMPERATURE: 98 F | BODY MASS INDEX: 26 KG/M2 | OXYGEN SATURATION: 98 % | DIASTOLIC BLOOD PRESSURE: 74 MMHG | RESPIRATION RATE: 18 BRPM

## 2025-07-24 DIAGNOSIS — L03.019 ONYCHIA AND PARONYCHIA OF FINGER: Primary | ICD-10-CM

## 2025-07-24 PROCEDURE — 26010 DRAINAGE OF FINGER ABSCESS: CPT

## 2025-07-24 PROCEDURE — 99213 OFFICE O/P EST LOW 20 MIN: CPT

## 2025-07-24 PROCEDURE — 99214 OFFICE O/P EST MOD 30 MIN: CPT

## 2025-07-24 RX ORDER — SULFAMETHOXAZOLE AND TRIMETHOPRIM 800; 160 MG/1; MG/1
1 TABLET ORAL 2 TIMES DAILY
Qty: 14 TABLET | Refills: 0 | Status: SHIPPED | OUTPATIENT
Start: 2025-07-24 | End: 2025-07-31

## 2025-07-24 NOTE — ED INITIAL ASSESSMENT (HPI)
Pt states two weeks ago, a small piece of wood punctured his right pointer finger. Finger is swollen. Pt states at the time there was a small amount of blood after puncture happened. Site is Tender to the touch, denies fevers. Two days ago he placed finger in hot salt water and was able to push was some fluid from finger. He also put hydrogen peroxide to site and alcohol.

## 2025-07-24 NOTE — ED PROVIDER NOTES
Patient Seen in: [facility]       The following individual(s) verbally consented to be recorded using ambient AI listening technology and understand that they can each withdraw their consent to this listening technology at any point by asking the clinician to turn off or pause the recording:    Patient name: Yanick Rust  Additional names:        History  No chief complaint on file.    Stated Complaint: Finger Infection    Subjective:   HPI     Yanick Rust is a 64 year old male who presents with an infected right second finger.    he reports redness and pus accumulation in the right second finger, with symptoms beginning near the fingernail area. 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            Objective:     Past Medical History:    Acute, but ill-defined, cerebrovascular disease    Blood in the stool    Hemorrhoids    High cholesterol    Other and unspecified hyperlipidemia    Visual impairment    Vitamin D deficiency    Wears glasses              Past Surgical History:   Procedure Laterality Date    Anesth,knee area surgery      torn ACL left    Appendectomy  03/2009    Appendectomy      Cholecystectomy      Hemorrhoidectomy,int/ext,simple  2018    Other surgical history      left knee surgery    Other surgical history      lipomas    Removal gallbladder      Surgery - internal Left     testicle during childhood                Social History     Socioeconomic History    Marital status:    Tobacco Use    Smoking status: Never    Smokeless tobacco: Never   Vaping Use    Vaping status: Never Used   Substance and Sexual Activity    Alcohol use: Yes     Alcohol/week: 3.0 standard drinks of alcohol     Types: 1 Glasses of wine, 1 Cans of beer, 1 Shots of liquor per week     Comment: wine on occasion    Drug use: Never    Sexual activity: Yes     Partners: Female   Other Topics Concern    Caffeine Concern Yes     Comment: 2-3 cups per day coffee     Exercise Yes     Comment: treadmill at home 30 mins 2-3x/wk    Seat Belt Yes     Social Drivers of Health     Food Insecurity: No Food Insecurity (10/26/2023)    Food Insecurity     Food Insecurity: Never true   Transportation Needs: No Transportation Needs (10/26/2023)    Transportation Needs     Lack of Transportation: No   Housing Stability: Low Risk  (10/26/2023)    Housing Stability     Housing Instability: No              Review of Systems    Positive for stated complaint: Finger Infection  Other systems are as noted in HPI.  Constitutional and vital signs reviewed.      All other systems reviewed and negative except as noted above.                  Physical Exam    ED Triage Vitals [07/24/25 0908]   /74   Pulse 67   Resp 18   Temp 98.1 °F (36.7 °C)   Temp src Oral   SpO2 98 %   O2 Device None (Room air)       Current Vitals:   Vital Signs  BP: 132/74  Pulse: 67  Resp: 18  Temp: 98.1 °F (36.7 °C)  Temp src: Oral    Oxygen Therapy  SpO2: 98 %  O2 Device: None (Room air)        Pertinent physical exam:      EXTREMITIES: Right second finger with pus pocket.       Physical Exam            ED Course  Labs Reviewed - No data to display   Incision/Drainage of Paronychia  Consent: Verbal consent obtained.  Risks and benefits: risks, benefits and alternatives were discussed  Consent given by: patient  Patient understanding: patient states understanding of the procedure being performed  Patient consent: the patient's understanding of the procedure matches consent given  Procedure consent: procedure consent matches procedure scheduled  Patient identity confirmed: verbally with patient and arm band  Time out: Immediately prior to procedure a \"time out\" was called to verify the correct patient, procedure, equipment, support staff and site/side marked as required.  Type: abscess  Body area: upper extremity  Location details: Right second finger  Anesthesia: nerve block  Local anesthetic: Topical let  Anesthetic total:  4 ml  Incision type: single straight  Complexity: simple  Drainage: purulent  Drainage amount: mod    Patient tolerance: Patient tolerated the procedure well with no immediate complications.         MDM     Medical Decision Making    Assessment & Plan  Infection of right second finger  Acute infection with pus accumulation, likely from the fingernail.  - Administer local anesthesia to the right second finger.  - Perform incision and drainage of pus pocket.  - Prescribe antibiotics.      Disposition and Plan     Clinical Impression:  1. Onychia and paronychia of finger         Disposition:  Discharge  7/24/2025 10:12 am    Follow-up:  Gloria Salazar MD  [address] [place] [id number]  [phone]                Medications Prescribed:  Current Discharge Medication List        START taking these medications    Details   sulfamethoxazole-trimethoprim -160 MG Oral Tab per tablet Take 1 tablet by mouth 2 (two) times daily for 7 days.  Qty: 14 tablet, Refills: 0                   Supplementary Documentation:

## 2025-08-12 ENCOUNTER — OFFICE VISIT (OUTPATIENT)
Dept: FAMILY MEDICINE CLINIC | Facility: CLINIC | Age: 65
End: 2025-08-12

## 2025-08-12 VITALS
BODY MASS INDEX: 25.73 KG/M2 | SYSTOLIC BLOOD PRESSURE: 110 MMHG | RESPIRATION RATE: 18 BRPM | WEIGHT: 190 LBS | HEIGHT: 72 IN | TEMPERATURE: 97 F | HEART RATE: 68 BPM | DIASTOLIC BLOOD PRESSURE: 80 MMHG

## 2025-08-12 DIAGNOSIS — Z13.89 SCREENING FOR GENITOURINARY CONDITION: ICD-10-CM

## 2025-08-12 DIAGNOSIS — Z12.5 SCREENING FOR PROSTATE CANCER: ICD-10-CM

## 2025-08-12 DIAGNOSIS — Z00.00 LABORATORY EXAMINATION ORDERED AS PART OF A ROUTINE GENERAL MEDICAL EXAMINATION: ICD-10-CM

## 2025-08-12 DIAGNOSIS — E78.00 HYPERCHOLESTEREMIA: ICD-10-CM

## 2025-08-12 DIAGNOSIS — E04.1 THYROID NODULE: ICD-10-CM

## 2025-08-12 DIAGNOSIS — Z00.00 ANNUAL PHYSICAL EXAM: Primary | ICD-10-CM

## 2025-08-13 ENCOUNTER — MED REC SCAN ONLY (OUTPATIENT)
Dept: FAMILY MEDICINE CLINIC | Facility: CLINIC | Age: 65
End: 2025-08-13

## (undated) DEVICE — MONOFILAMENT ABSORBABLE SUTURE: Brand: MAXON

## (undated) DEVICE — INSUFFLATION NEEDLE TO ESTABLISH PNEUMOPERITONEUM.: Brand: INSUFFLATION NEEDLE

## (undated) DEVICE — ZIPWIRE GUIDE .038X150 STR/STF

## (undated) DEVICE — CAPSURE PERMANENT FIXATION SYSTEM 30 PERMANENT FASTENERS: Brand: CAPSURE PERMANENT FIXATION SYSTEM

## (undated) DEVICE — SYRINGE 30ML LL TIP

## (undated) DEVICE — STERILE POLYISOPRENE POWDER-FREE SURGICAL GLOVES: Brand: PROTEXIS

## (undated) DEVICE — LIGHT HANDLE

## (undated) DEVICE — TROCAR: Brand: KII SHIELDED BLADED ACCESS SYSTEM

## (undated) DEVICE — Device

## (undated) DEVICE — POUCH SPECIMEN WIRE 6X3 250ML

## (undated) DEVICE — APPLICATOR CHLORAPREP 26ML

## (undated) DEVICE — DISPOSABLE LAPAROSCOPIC CLIP APPLIER WITH 20 CLIPS.: Brand: EPIX® UNIVERSAL CLIP APPLIER

## (undated) DEVICE — 40580 - THE PINK PAD - ADVANCED TRENDELENBURG POSITIONING KIT: Brand: 40580 - THE PINK PAD - ADVANCED TRENDELENBURG POSITIONING KIT

## (undated) DEVICE — TROCAR: Brand: KII® SLEEVE

## (undated) DEVICE — PAD SACRAL SPAN AID

## (undated) DEVICE — ENDOCUT SCISSOR TIP, DISPOSABLE: Brand: RENEW

## (undated) DEVICE — SOLUTION  .9 1000ML BTL

## (undated) DEVICE — UNDYED BRAIDED (POLYGLACTIN 910), SYNTHETIC ABSORBABLE SUTURE: Brand: COATED VICRYL

## (undated) DEVICE — VIOLET BRAIDED (POLYGLACTIN 910), SYNTHETIC ABSORBABLE SUTURE: Brand: COATED VICRYL

## (undated) DEVICE — SLEEVE KENDALL SCD EXPRESS MED

## (undated) DEVICE — HUNTER GASPER TIP, DISPOSABLE: Brand: RENEW

## (undated) DEVICE — TIGERTAIL 5F FLXTIP 70CM

## (undated) DEVICE — LAP CHOLE/APPY CDS-LF: Brand: MEDLINE INDUSTRIES, INC.

## (undated) DEVICE — GENERAL LAPAROS CDS-LF: Brand: MEDLINE INDUSTRIES, INC.

## (undated) DEVICE — TRAY CATH 16FR F INCL BARDX IC COMPLT CARE

## (undated) DEVICE — SOLUTION IRRIG 1000ML 0.9% NACL USP BTL

## (undated) DEVICE — SLEEVE COMPR M KNEE LEN SGL USE KENDALL SCD

## (undated) DEVICE — C-ARM: Brand: UNBRANDED

## (undated) DEVICE — SKN PREP SPNG STKS PVP PNT STR: Brand: MEDLINE INDUSTRIES, INC.

## (undated) DEVICE — PLUMEPORT ACTIV LAPAROSCOPIC SMOKE FILTRATION DEVICE: Brand: PLUMEPORT ACTIVE

## (undated) NOTE — LETTER
01/15/18    Patient: Sultana Hoffmann  : 10/2/1960 Visit date: 1/15/2018    Dear  Dr. Juni Naranjo MD,    Thank you for referring Sultana Hoffmann to my practice. Please find my assessment and plan below.       Assessment   Grade III internal hemorrhoids  (

## (undated) NOTE — LETTER
Date: 10/3/2022    Patient Name: Dian Higgins          To Whom it may concern: The above patient was seen at the [facility] for complete physical today.           Sincerely,      Poornima Bedoya MD

## (undated) NOTE — LETTER
24    Patient: Yanick Rust  : 10/2/1960 Visit date: 3/19/2024    Dear  EBONI Gutierrez    Thank you for referring Yanick Rust to my practice.  Please find my assessment and plan below.     Assessment   1. Status post inguinal hernia repair    2. Right inguinal pain          Plan     The patient has ongoing right inguinal pain following laparoscopic right inguinal hernia repair with mesh.  There is no evidence of hernia recurrence on clinical exam or by imaging.  The patient likely has musculoskeletal strain or a component of apraxia secondary to scarring and tacks from the hernia repair.  I recommend a course of physical therapy for range of motion, strengthening, training.  If physical therapy is unsuccessful the patient may benefit from referral to chronic pain specialist for injection, oral treatment or both.  The patient will return to my attention in 4 weeks after physical therapy is complete to assess clinical response and to discuss management options further.  The patient was provided ample opportunity to ask questions.  All of the patient's questions were answered in detail.  The patient voiced understanding of the care plan.      Sincerely,       Darwin Stratton MD   CC:   No Recipients

## (undated) NOTE — LETTER
Date: 7/27/2017    Patient Name: El Davenport          To Whom it may concern: This letter has been written at the patient's request. The above patient was seen at the [facility] for his complete physical today 7/ 27/2017.       Sincerely

## (undated) NOTE — LETTER
Date: 6/29/2019    Patient Name: Michael Cedeno          To Whom it may concern: The above patient was seen at the [facility] for his complete physical today.          Sincerely,      Jenny Cloud MD

## (undated) NOTE — LETTER
21    Patient: James Will  : 10/2/1960 Visit date: 2021    Dear  Kali Payne MD    Thank you for referring James Will to my practice. Please find my assessment and plan below.        Assessment   Soft tissue mass    Plan   The

## (undated) NOTE — Clinical Note
I had the pleasure of seeing Coco Zacarias on 9/20/2023. Please see my attached note.   Ray Peralta MD FACS [facility]Surgery

## (undated) NOTE — LETTER
18    Patient: Brenda Thompson  : 10/2/1960 Visit date: 3/12/2018    Dear  Dr. Wynell Romberg, MD,    Thank you for referring Brenda Donna to my practice. Please find my assessment and plan below.       Assessment   Grade III internal hemorrhoids  (

## (undated) NOTE — LETTER
Date: 11/16/2017    Patient Name: Jovi Tom          To Whom it may concern: The above patient was seen at the [facility] for treatment of a medical condition. Please excuse his absence from work.       Sincerely,      Nicolás Miranda

## (undated) NOTE — LETTER
Date: 11/14/2020    Patient Name: Tena Cooks          To Whom it may concern: The above patient was seen at the [facility] for his complete physical today.        Sincerely,    Rylee Navarro MD

## (undated) NOTE — MR AVS SNAPSHOT
[facility] [address] [facility]  [phone]               Thank you for choosing us for your health care visit with Victor Manuel Mendoza MD.  We are glad to serve you and happy to provide you with this summ Normal Orders This Visit    PODIATRY - INTERNAL [[id number] CUSTOM]  Order #:  [id number]         **REFERRAL REQUEST**    Your physician has referred you to a specialist.  Your physician or the clinic staff will provide you with the phone number you should Crutches can be rented at [employer] or surgical/orthopedic supply stores. · You may be given a cast shoe to wear to prevent movement in your toe.  If not, you can use a sandal or any shoe that does not put pressure on the injured toe until the swelli sometimes hurt as much as a fracture. These injuries can take time to heal completely. If your symptoms don’t improve or they get worse, talk with your healthcare provider. You may need a repeat X-ray.  If X-rays were taken, you will be told of any new find Visit [email or web address] [email or web address] org to learn more. Educational Information     Healthy Diet and Regular Exercise  [employer] for making healthy food choices  -   Enjoy your food, but eat less.   Fully enjoy your food when ea

## (undated) NOTE — LETTER
Date: 8/11/2018    Patient Name: Drew Whitten          To Whom it may concern: This letter has been written at the patient's request. The above patient was seen at the [facility] for complete physical on August 11, 2018.         Sincerely

## (undated) NOTE — LETTER
17    Patient: Amanuel Oquendo  : 10/2/1960 Visit date: 2017    Dear  Dr. Jd Rod MD,    Thank you for referring Amanuel Oquendo to my practice. Please find my assessment and plan below.                 Assessment   Intestinal bleeding  ( The patient is also overdue for his colonoscopy. Because of his rectal bleeding and age, I recommend a colonoscopy to rule out mass or other cause of bleeding. This will be scheduled in early January.     All risks, benefits, complications and alternatives

## (undated) NOTE — LETTER
Date: 8/11/2018    Patient Name: Jimy Mendoza          To Whom it may concern: This letter has been written at the patient's request. The above patient was seen at the [facility] for complete physical on August 11, 2018.       Sincerely,

## (undated) NOTE — LETTER
Date: 07/23/2024     Patient Name: Yanick Rust              To Whom it may concern:     The above patient was seen at the [facility] for complete physical today.            Sincerely,     Gloria Salazar MD

## (undated) NOTE — LETTER
10/26/2023    Return to Work    Name: Dian Higgins        : 10/2/1960      To Whom It May Concern,      Dian Higgins had surgery on 10/26/2023 and is able to return to work with restrictions:    No lifting over 10 lbs until 2023. The patient may return to work on 11/3/2023. If there are any further questions, regarding this patient's care, please contact the patient directly.       Sincerely,      Tripp Garcia MD

## (undated) NOTE — LETTER
Date: 9/11/2023    Patient Name: Maira Rosenthal          To Whom it may concern: The above patient was seen at the [facility] for complete physical today.          Sincerely,    Kymberly Butts MD

## (undated) NOTE — LETTER
2022    Return to Work    Name: Army Lua        : 10/2/1960      To Whom It May Concern,      Army Lua had surgery on 2022 and is able to return to work with restrictions:     No lifting over: 10 lbs until 2023. The patient may return to work on 2022. If there are any further questions, regarding this patient's care, please contact the patient directly.       Sincerely,      Gem Guerrero MD

## (undated) NOTE — LETTER
18    Patient: Callum Portillo  : 10/2/1960 Visit date: 3/26/2018    Dear  Dr. Kiki Chiu MD,    Thank you for referring Callum Portillo to my practice. Please find my assessment and plan below.       Assessment   Grade III internal hemorrhoids  (